# Patient Record
Sex: MALE | Race: ASIAN | NOT HISPANIC OR LATINO | URBAN - METROPOLITAN AREA
[De-identification: names, ages, dates, MRNs, and addresses within clinical notes are randomized per-mention and may not be internally consistent; named-entity substitution may affect disease eponyms.]

---

## 2019-07-23 ENCOUNTER — EMERGENCY (EMERGENCY)
Facility: HOSPITAL | Age: 64
LOS: 1 days | Discharge: HOME | End: 2019-07-23
Attending: EMERGENCY MEDICINE | Admitting: EMERGENCY MEDICINE
Payer: COMMERCIAL

## 2019-07-23 VITALS
TEMPERATURE: 97 F | SYSTOLIC BLOOD PRESSURE: 156 MMHG | OXYGEN SATURATION: 97 % | HEART RATE: 77 BPM | DIASTOLIC BLOOD PRESSURE: 94 MMHG | RESPIRATION RATE: 17 BRPM

## 2019-07-23 LAB
ALBUMIN SERPL ELPH-MCNC: 4.1 G/DL — SIGNIFICANT CHANGE UP (ref 3.3–5)
ALP SERPL-CCNC: 67 U/L — SIGNIFICANT CHANGE UP (ref 40–120)
ALT FLD-CCNC: 18 U/L — SIGNIFICANT CHANGE UP (ref 4–41)
ANION GAP SERPL CALC-SCNC: 10 MMO/L — SIGNIFICANT CHANGE UP (ref 7–14)
AST SERPL-CCNC: 15 U/L — SIGNIFICANT CHANGE UP (ref 4–40)
BASOPHILS # BLD AUTO: 0.05 K/UL — SIGNIFICANT CHANGE UP (ref 0–0.2)
BASOPHILS NFR BLD AUTO: 0.8 % — SIGNIFICANT CHANGE UP (ref 0–2)
BILIRUB SERPL-MCNC: 0.5 MG/DL — SIGNIFICANT CHANGE UP (ref 0.2–1.2)
BUN SERPL-MCNC: 8 MG/DL — SIGNIFICANT CHANGE UP (ref 7–23)
CALCIUM SERPL-MCNC: 9.5 MG/DL — SIGNIFICANT CHANGE UP (ref 8.4–10.5)
CHLORIDE SERPL-SCNC: 103 MMOL/L — SIGNIFICANT CHANGE UP (ref 98–107)
CO2 SERPL-SCNC: 27 MMOL/L — SIGNIFICANT CHANGE UP (ref 22–31)
CREAT SERPL-MCNC: 0.71 MG/DL — SIGNIFICANT CHANGE UP (ref 0.5–1.3)
EOSINOPHIL # BLD AUTO: 0.15 K/UL — SIGNIFICANT CHANGE UP (ref 0–0.5)
EOSINOPHIL NFR BLD AUTO: 2.5 % — SIGNIFICANT CHANGE UP (ref 0–6)
GLUCOSE SERPL-MCNC: 106 MG/DL — HIGH (ref 70–99)
HCT VFR BLD CALC: 42.6 % — SIGNIFICANT CHANGE UP (ref 39–50)
HGB BLD-MCNC: 14.5 G/DL — SIGNIFICANT CHANGE UP (ref 13–17)
IMM GRANULOCYTES NFR BLD AUTO: 0.5 % — SIGNIFICANT CHANGE UP (ref 0–1.5)
LIDOCAIN IGE QN: 36.7 U/L — SIGNIFICANT CHANGE UP (ref 7–60)
LIDOCAIN IGE QN: 37.3 U/L — SIGNIFICANT CHANGE UP (ref 7–60)
LYMPHOCYTES # BLD AUTO: 1.92 K/UL — SIGNIFICANT CHANGE UP (ref 1–3.3)
LYMPHOCYTES # BLD AUTO: 31.5 % — SIGNIFICANT CHANGE UP (ref 13–44)
MCHC RBC-ENTMCNC: 31 PG — SIGNIFICANT CHANGE UP (ref 27–34)
MCHC RBC-ENTMCNC: 34 % — SIGNIFICANT CHANGE UP (ref 32–36)
MCV RBC AUTO: 91 FL — SIGNIFICANT CHANGE UP (ref 80–100)
MONOCYTES # BLD AUTO: 0.51 K/UL — SIGNIFICANT CHANGE UP (ref 0–0.9)
MONOCYTES NFR BLD AUTO: 8.4 % — SIGNIFICANT CHANGE UP (ref 2–14)
NEUTROPHILS # BLD AUTO: 3.43 K/UL — SIGNIFICANT CHANGE UP (ref 1.8–7.4)
NEUTROPHILS NFR BLD AUTO: 56.3 % — SIGNIFICANT CHANGE UP (ref 43–77)
NRBC # FLD: 0 K/UL — SIGNIFICANT CHANGE UP (ref 0–0)
PLATELET # BLD AUTO: 241 K/UL — SIGNIFICANT CHANGE UP (ref 150–400)
PMV BLD: 9.6 FL — SIGNIFICANT CHANGE UP (ref 7–13)
POTASSIUM SERPL-MCNC: 4.2 MMOL/L — SIGNIFICANT CHANGE UP (ref 3.5–5.3)
POTASSIUM SERPL-SCNC: 4.2 MMOL/L — SIGNIFICANT CHANGE UP (ref 3.5–5.3)
PROT SERPL-MCNC: 7.2 G/DL — SIGNIFICANT CHANGE UP (ref 6–8.3)
RBC # BLD: 4.68 M/UL — SIGNIFICANT CHANGE UP (ref 4.2–5.8)
RBC # FLD: 11.8 % — SIGNIFICANT CHANGE UP (ref 10.3–14.5)
SODIUM SERPL-SCNC: 140 MMOL/L — SIGNIFICANT CHANGE UP (ref 135–145)
TROPONIN T, HIGH SENSITIVITY: 13 NG/L — SIGNIFICANT CHANGE UP (ref ?–14)
TROPONIN T, HIGH SENSITIVITY: 14 NG/L — SIGNIFICANT CHANGE UP (ref ?–14)
WBC # BLD: 6.09 K/UL — SIGNIFICANT CHANGE UP (ref 3.8–10.5)
WBC # FLD AUTO: 6.09 K/UL — SIGNIFICANT CHANGE UP (ref 3.8–10.5)

## 2019-07-23 PROCEDURE — 71045 X-RAY EXAM CHEST 1 VIEW: CPT | Mod: 26

## 2019-07-23 PROCEDURE — 99218: CPT

## 2019-07-23 RX ORDER — CARVEDILOL PHOSPHATE 80 MG/1
12.5 CAPSULE, EXTENDED RELEASE ORAL EVERY 12 HOURS
Refills: 0 | Status: DISCONTINUED | OUTPATIENT
Start: 2019-07-23 | End: 2019-07-27

## 2019-07-23 RX ORDER — METFORMIN HYDROCHLORIDE 850 MG/1
1000 TABLET ORAL
Refills: 0 | Status: DISCONTINUED | OUTPATIENT
Start: 2019-07-23 | End: 2019-07-27

## 2019-07-23 RX ORDER — LISINOPRIL 2.5 MG/1
20 TABLET ORAL DAILY
Refills: 0 | Status: DISCONTINUED | OUTPATIENT
Start: 2019-07-23 | End: 2019-07-27

## 2019-07-23 RX ORDER — ATORVASTATIN CALCIUM 80 MG/1
20 TABLET, FILM COATED ORAL AT BEDTIME
Refills: 0 | Status: DISCONTINUED | OUTPATIENT
Start: 2019-07-23 | End: 2019-07-27

## 2019-07-23 RX ADMIN — CARVEDILOL PHOSPHATE 12.5 MILLIGRAM(S): 80 CAPSULE, EXTENDED RELEASE ORAL at 22:09

## 2019-07-23 RX ADMIN — ATORVASTATIN CALCIUM 20 MILLIGRAM(S): 80 TABLET, FILM COATED ORAL at 22:09

## 2019-07-23 NOTE — ED PROVIDER NOTE - PMH
Diabetes Mellitus Type II (ICD9 250.00)    HTN (Hypertension) (ICD9 401.9)    NIDDM in Obese (ICD9 250.00)

## 2019-07-23 NOTE — ED PROVIDER NOTE - ATTENDING CONTRIBUTION TO CARE
64 year old with exterional cp. concern for acs vs pna vs ptx vs msk. will check labs trop ekg, cxr. if all wnl will send to cdu for stress to risk stratify

## 2019-07-23 NOTE — ED ADULT NURSE NOTE - NSIMPLEMENTINTERV_GEN_ALL_ED
Implemented All Universal Safety Interventions:  [place] to call system. Call bell, personal items and telephone within reach. Instruct patient to call for assistance. Room bathroom lighting operational. Non-slip footwear when patient is off stretcher. Physically safe environment: no spills, clutter or unnecessary equipment. Stretcher in lowest position, wheels locked, appropriate side rails in place.

## 2019-07-23 NOTE — ED PROVIDER NOTE - CLINICAL SUMMARY MEDICAL DECISION MAKING FREE TEXT BOX
64 y.o male pmhx of CAD with stent, HTN, HLD, DM coming in with nonexertional, nonpleuritic, L sided chest pain for the last 2 weeks. Will check basics, troponin, lipase, xray chest, EKG, admit/obs for stress.

## 2019-07-23 NOTE — ED PROVIDER NOTE - PROGRESS NOTE DETAILS
CXR wnl, trop 13. Will place in CDU for stress/echo, further observation and care  Braden Pro MD, PGY3 Emergency Medicine

## 2019-07-23 NOTE — ED ADULT NURSE NOTE - OBJECTIVE STATEMENT
Receive pt. in room 21 alert and oriented x 4 with complaints of left side chest pain. Pt. have a history of Cardiac stents, DM and stated " my chest have been hurting on and off for 2 weeks and it is not going away". Placed on cardiac monitor, labs sent will continue to monitor.

## 2019-07-23 NOTE — ED PROVIDER NOTE - OBJECTIVE STATEMENT
64 y.o male pmhx of CAD with stent, HTN, HLD, DM coming in with nonexertional, nonpleuritic, L sided chest pain for the last 2 weeks. States pain improves when he pushes on the area. Reports feeling weak as well and diaphoretic when he walk, no associated SOB. Denies fevers, chills, sob, cough, n/v/d, abdominal pain, numbness, tingling ,weakness. States he saw his cardiologist 1 month ago Dr. Jefferson and things were normal ( unsure of what tests performed). No recent travel, no LE swelling.

## 2019-07-23 NOTE — ED CDU PROVIDER INITIAL DAY NOTE - OBJECTIVE STATEMENT
64 y.o male pmhx of CAD with stent- 2014 in [facility], HTN, HLD, DM coming in with nonexertional, nonpleuritic, L sided chest pain for the last 2 weeks. States pain improves when he pushes on the area. Reports feeling weak as well and diaphoretic when he walk, no associated SOB. Denies fevers, chills, sob, cough, n/v/d, abdominal pain, numbness, tingling ,weakness. States he saw his cardiologist 1 month ago Dr. Jefferson and things were normal ( unsure of what tests performed). No recent travel, no LE swelling. in ER trop # 1 was 13, ekg no stemi or other acute findings, placed in cdu for tele, stress, echo, reasses

## 2019-07-24 VITALS
SYSTOLIC BLOOD PRESSURE: 155 MMHG | HEART RATE: 72 BPM | TEMPERATURE: 97 F | RESPIRATION RATE: 18 BRPM | DIASTOLIC BLOOD PRESSURE: 87 MMHG | OXYGEN SATURATION: 97 %

## 2019-07-24 PROCEDURE — 93016 CV STRESS TEST SUPVJ ONLY: CPT | Mod: GC

## 2019-07-24 PROCEDURE — 93018 CV STRESS TEST I&R ONLY: CPT | Mod: GC

## 2019-07-24 PROCEDURE — 78452 HT MUSCLE IMAGE SPECT MULT: CPT | Mod: 26

## 2019-07-24 PROCEDURE — 93306 TTE W/DOPPLER COMPLETE: CPT | Mod: 26

## 2019-07-24 PROCEDURE — 99217: CPT

## 2019-07-24 RX ADMIN — Medication 10 MILLIGRAM(S): at 09:05

## 2019-07-24 RX ADMIN — METFORMIN HYDROCHLORIDE 1000 MILLIGRAM(S): 850 TABLET ORAL at 09:05

## 2019-07-24 RX ADMIN — CARVEDILOL PHOSPHATE 12.5 MILLIGRAM(S): 80 CAPSULE, EXTENDED RELEASE ORAL at 10:43

## 2019-07-24 RX ADMIN — LISINOPRIL 20 MILLIGRAM(S): 2.5 TABLET ORAL at 06:28

## 2019-07-24 NOTE — ED CDU PROVIDER SUBSEQUENT DAY NOTE - PROGRESS NOTE DETAILS
Received signout from [facility] pending stress and echo this morning. Spoke with pts cardiologist Dr.Arun Jefferson ([phone], answering service [phone]) he is not in the office today so he is unable to confirm testing that pt reports was done 2 months ago. States if stress test is negative pt can follow up in his office Spoke with cardiology fellow in regards to stress test findings of hypokinesis and EF 44%, she spoke with  and he reported that the stress test results to not require further testing, within normal. Abnormal results were due to an adjustment of the pictures. Discussed with pt, he will follow up with his cardiologist . Pt is requesting to leave the CDU prior to having direct conversation with  myself. Spoke with , stress has no evidence of ischemia. EF on echo is more accurate than on stress, normal EF on echo.

## 2019-07-24 NOTE — ED CDU PROVIDER SUBSEQUENT DAY NOTE - ATTENDING CONTRIBUTION TO CARE
agree with PA note  "64 y.o male pmhx of CAD with stent- 2014 in [facility], HTN, HLD, DM coming in with nonexertional, nonpleuritic, L sided chest pain for the last 2 weeks. States pain improves when he pushes on the area. Reports feeling weak as well and diaphoretic when he walk, no associated SOB."  This morning pt has no complaints.  On my hx taking states pain arises mostly after eating.  States follows with Dr. Jefferson at [facility] and states gets yearly stress/echo and had it recently and was negative.    PE: well appearing; VSS: CTAB/L; s1 s2 no m/r/g abd soft/NT/ND ext: no edema     Imp: EKG LBBB with lateral ischemia; trop negative;  attempted to reach pt cardiologist regarding dispo/follow up

## 2019-07-24 NOTE — ED CDU PROVIDER SUBSEQUENT DAY NOTE - HISTORY
64 y.o male pmhx of CAD with stent- 2014 in [facility], HTN, HLD, DM coming in with nonexertional, nonpleuritic, L sided chest pain for the last 2 weeks. States pain improves when he pushes on the area. Reports feeling weak as well and diaphoretic when he walk, no associated SOB. Denies fevers, chills, sob, cough, n/v/d, abdominal pain, numbness, tingling ,weakness. States he saw his cardiologist 1 month ago Dr. Jefferson and things were normal ( unsure of what tests performed). No recent travel, no LE swelling. in ER trop # 1 was 13, ekg no stemi or other acute findings, placed in cdu for tele, stress, echo, reasses

## 2019-07-24 NOTE — ED CDU PROVIDER SUBSEQUENT DAY NOTE - MEDICAL DECISION MAKING DETAILS
pt resting ciomfortably in bed at this time, denies any chest pain, sob, diaphoresis, on tele, awaiting stress, echo, will  reasses

## 2019-07-24 NOTE — ED CDU PROVIDER DISPOSITION NOTE - CLINICAL COURSE
"64 y.o male pmhx of CAD with stent- 2014 in [facility], HTN, HLD, DM coming in with nonexertional, nonpleuritic, L sided chest pain for the last 2 weeks. States pain improves when he pushes on the area. Reports feeling weak as well and diaphoretic when he walk, no associated SOB."  This morning pt has no complaints.  On my hx taking states pain arises mostly after eating.  States follows with Dr. Jefferson at [facility] and states gets yearly stress/echo and had it recently and was negative.    EKG LBBB with lateral ischemic changes  trops negative  stress test showed area of hypokinesis; cardiology called and state within normal limits; pt has cardiologist he will follow up with this week.  Stable for d/c

## 2019-07-24 NOTE — ED CDU PROVIDER DISPOSITION NOTE - NSFOLLOWUPINSTRUCTIONS_ED_ALL_ED_FT
Follow up with your primary care doctor and cardiologist within 1 week   Show copies of your reports given to you.   Take all of your other medications as previously prescribed.   Worsening or continued chest pain, shortness of breath, weakness,  or any other concerns return to ED.

## 2022-02-03 PROBLEM — Z00.00 ENCOUNTER FOR PREVENTIVE HEALTH EXAMINATION: Status: ACTIVE | Noted: 2022-02-03

## 2022-02-07 ENCOUNTER — NON-APPOINTMENT (OUTPATIENT)
Age: 67
End: 2022-02-07

## 2022-02-07 ENCOUNTER — APPOINTMENT (OUTPATIENT)
Dept: ORTHOPEDIC SURGERY | Facility: CLINIC | Age: 67
End: 2022-02-07
Payer: MEDICARE

## 2022-02-07 PROCEDURE — 99203 OFFICE O/P NEW LOW 30 MIN: CPT

## 2022-02-07 NOTE — ADDENDUM
[FreeTextEntry1] : I, Judith De wrote this note acting as a scribe for Dr. Jay Edwards on Feb 07, 2022.

## 2022-02-07 NOTE — HISTORY OF PRESENT ILLNESS
[FreeTextEntry1] : Pt is a 65 y/o male c/o bilateral hand pain intermittently for 1 year.  He states that it has gotten significantly worse in the past 6 months.  He is unable to completely flex the fingers.  He has pain at the PIP's.  The fingers swell.  His son states that he had lab work which he reports was all normal.  The hands are tender to touch.  He cannot open bottles or  anything without pain.  He states about 10 years ago he saw a rheumatologist who did not find anything abnormal.\par \par He has an A1c of 7.9. He takes Metformin and Jardienz.

## 2022-02-07 NOTE — END OF VISIT
[FreeTextEntry3] : All medical record entries made by the Scribe were at my,  Dr. Jay Edwards MD., direction and personally dictated by me on 02/07/2022. I have personally reviewed the chart and agree that the record accurately reflects my personal performance of the history, physical exam, assessment and plan.

## 2022-02-07 NOTE — DISCUSSION/SUMMARY
[FreeTextEntry1] : The underlying pathophysiology was reviewed with the patient. XR films were reviewed with the patient. Discussed at length the nature of the patient’s condition. \par \par At this time, I am referring him to a rheumatologist for further evaluation and blood work. \par He was advised on use of topical modalities as well as oral antiinflammatories.\par He was instructed to soak the hand in warm water and Epsom salts for relief.\par I would not advise use of oral steroids or a cortisone injection given the side effects of elevating his blood sugar in the short term. \par Finally, I did instruct him on lifestyle changes such walking or use of a stationary bike as this may help in controlling his Diabetes.\par Deferred an RX for hand therapy.\par \par All questions answered, understanding verbalized. Patient in agreement with plan of care. Follow up in 6 weeks, if needed.

## 2022-02-07 NOTE — PHYSICAL EXAM
[de-identified] : Patient is WDWN, alert, and in no acute distress. Breathing is unlabored. He is grossly oriented to person, place, and time.\par \par He is accompanied by his son today.\par \par Right Hand:\par Normal sensation intact, provocative signs for carpal tunnel are negative.\par Notable swelling present to the PIP joints of the index, middle and ring fingers\par LImitations of motion about the PIP joints of the index, middle and ring fingers with limitations of motion at the DIP joints\par Full wrist motion into flexion and extension present. \par MIld tenderness to the A1 pulleys of the index, middle and ring fingers without triggering\par \par Left Hand:\par Normal sensation intact, provocative signs for carpal tunnel are negative.\par Notable swelling present to the PIP joints of the index, middle and ring fingers\par LImitations of motion about the PIP joints of the index, middle and ring fingers with limitations of motion to the DIP joint\par Full wrist motion into flexion and extension present.  [de-identified] : IMAGING OF THE RIGHT AND LEFT WRISTS/HANDS WERE REVIEWED FROM [facility] ON 10/9/21\par IMPRESSION:\par 3 views of the right hand --> Previous fracture, albeit healed noted to the distal aspect of the proximal phalanx. Arthritis present to the PIP joints of the digits\par 3 views of the left hand -->

## 2022-02-08 ENCOUNTER — RESULT REVIEW (OUTPATIENT)
Age: 67
End: 2022-02-08

## 2022-02-08 ENCOUNTER — OUTPATIENT (OUTPATIENT)
Dept: OUTPATIENT SERVICES | Facility: HOSPITAL | Age: 67
LOS: 1 days | End: 2022-02-08

## 2022-02-08 ENCOUNTER — APPOINTMENT (OUTPATIENT)
Dept: RHEUMATOLOGY | Facility: CLINIC | Age: 67
End: 2022-02-08
Payer: MEDICARE

## 2022-02-08 ENCOUNTER — APPOINTMENT (OUTPATIENT)
Dept: RADIOLOGY | Facility: CLINIC | Age: 67
End: 2022-02-08
Payer: MEDICARE

## 2022-02-08 ENCOUNTER — NON-APPOINTMENT (OUTPATIENT)
Age: 67
End: 2022-02-08

## 2022-02-08 VITALS
DIASTOLIC BLOOD PRESSURE: 71 MMHG | HEIGHT: 67 IN | BODY MASS INDEX: 32.33 KG/M2 | SYSTOLIC BLOOD PRESSURE: 119 MMHG | TEMPERATURE: 98.9 F | OXYGEN SATURATION: 97 % | HEART RATE: 71 BPM | WEIGHT: 206 LBS

## 2022-02-08 DIAGNOSIS — Z78.9 OTHER SPECIFIED HEALTH STATUS: ICD-10-CM

## 2022-02-08 PROCEDURE — 73130 X-RAY EXAM OF HAND: CPT | Mod: 26,LT,RT

## 2022-02-08 PROCEDURE — 99203 OFFICE O/P NEW LOW 30 MIN: CPT | Mod: 25

## 2022-02-09 PROBLEM — Z78.9 NON-SMOKER: Status: ACTIVE | Noted: 2022-02-08

## 2022-02-09 LAB
ALBUMIN SERPL ELPH-MCNC: 4.4 G/DL
ALP BLD-CCNC: 109 U/L
ALT SERPL-CCNC: 17 U/L
ANION GAP SERPL CALC-SCNC: 14 MMOL/L
AST SERPL-CCNC: 15 U/L
BASOPHILS # BLD AUTO: 0.06 K/UL
BASOPHILS NFR BLD AUTO: 0.8 %
BILIRUB SERPL-MCNC: 0.3 MG/DL
BUN SERPL-MCNC: 15 MG/DL
CALCIUM SERPL-MCNC: 10.1 MG/DL
CCP AB SER IA-ACNC: <8 UNITS
CHLORIDE SERPL-SCNC: 98 MMOL/L
CO2 SERPL-SCNC: 23 MMOL/L
CREAT SERPL-MCNC: 0.84 MG/DL
CRP SERPL-MCNC: 13 MG/L
EOSINOPHIL # BLD AUTO: 0.16 K/UL
EOSINOPHIL NFR BLD AUTO: 2.1 %
ERYTHROCYTE [SEDIMENTATION RATE] IN BLOOD BY WESTERGREN METHOD: 56 MM/HR
GLUCOSE SERPL-MCNC: 278 MG/DL
HCT VFR BLD CALC: 45.1 %
HGB BLD-MCNC: 14.5 G/DL
IMM GRANULOCYTES NFR BLD AUTO: 0.1 %
LYMPHOCYTES # BLD AUTO: 2 K/UL
LYMPHOCYTES NFR BLD AUTO: 25.9 %
MAN DIFF?: NORMAL
MCHC RBC-ENTMCNC: 30 PG
MCHC RBC-ENTMCNC: 32.2 GM/DL
MCV RBC AUTO: 93.2 FL
MONOCYTES # BLD AUTO: 0.47 K/UL
MONOCYTES NFR BLD AUTO: 6.1 %
NEUTROPHILS # BLD AUTO: 5.02 K/UL
NEUTROPHILS NFR BLD AUTO: 65 %
PLATELET # BLD AUTO: 309 K/UL
POTASSIUM SERPL-SCNC: 4.4 MMOL/L
PROT SERPL-MCNC: 7.5 G/DL
RBC # BLD: 4.84 M/UL
RBC # FLD: 12.1 %
RF+CCP IGG SER-IMP: NEGATIVE
SODIUM SERPL-SCNC: 135 MMOL/L
WBC # FLD AUTO: 7.72 K/UL

## 2022-02-09 NOTE — DATA REVIEWED
[FreeTextEntry1] : 	\par EXAM:  X-RAY LEFT HAND AND FINGERS 3 VIEWS\par \par HISTORY:  Hand pain.\par \par TECHNIQUE:  Radiographs left hand, PA, lateral, and oblique views.\par \par COMPARISON:  Radiographs of the contralateral hand of the same date.\par \par IMPRESSION:\par Bone mineral density appears normal.\par There is no visible acute or healing fracture. There is no post traumatic deformity.  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 There are no visible erosions.\par Articular alignment is normal.\par There are no appreciable lytic or blastic lesions.\par There is mild soft tissue swelling dorsal to the metacarpal heads.\par There is mild soft tissue swelling about the index finger PIP joint.\par \par Thank you for the opportunity to participate in the care of this patient.  \par  \par Jose Juan Arboleda MD  - Electronically Signed: 10- 2:50 PM \par Physician to Physician Direct Line is: [phone]\par \par \par EXAM:  X-RAY RIGHT HAND AND FINGERS 3 VIEWS\par \par HISTORY:  Hand pain.\par \par TECHNIQUE:  Radiographs right hand, PA, lateral, and oblique views.\par \par COMPARISON:  Radiographs of the contralateral hand of the same date.\par \par IMPRESSION:\par Bone mineral density appears normal.\par There is no visible acute or healing fracture. There is a 1 cm exostosis of the radial aspect of the fifth proximal phalangeal head neck junction, compatible with a posttraumatic exostosis versus an osteochondroma.\par There is moderate osseous irregularity of the ulnar styloid, suggesting old trauma.\par There is moderate to severe basilar and mild to moderate first MTP and first interphalangeal joint osteoarthritis. There is a 5 mm chronic ossicle radial to the third finger proximal phalangeal head, compatible with old trauma. There are no visible erosions.\par Articular alignment is normal.\par There are no appreciable lytic or blastic lesions.\par There is mild soft tissue swelling about the index finger PIP joint.\par \par \par Thank you for the opportunity to participate in the care of this patient.

## 2022-02-09 NOTE — REVIEW OF SYSTEMS
[Arthralgias] : arthralgias [Joint Pain] : joint pain [Joint Swelling] : joint swelling [Joint Stiffness] : joint stiffness [Negative] : Heme/Lymph [FreeTextEntry9] : hands

## 2022-02-09 NOTE — PHYSICAL EXAM
[General Appearance - Alert] : alert [General Appearance - In No Acute Distress] : in no acute distress [General Appearance - Well-Appearing] : healthy appearing [Sclera] : the sclera and conjunctiva were normal [Respiration, Rhythm And Depth] : normal respiratory rhythm and effort [Exaggerated Use Of Accessory Muscles For Inspiration] : no accessory muscle use [Abnormal Walk] : normal gait [] : no rash [FreeTextEntry1] : No psoriasis

## 2022-02-09 NOTE — HISTORY OF PRESENT ILLNESS
[FreeTextEntry1] : 66 year old man referred for rheumatology evaluation\par Patient with bilateral hand pain\par \par had a flare of pain in the hands in October 2021\par labs at that time with elevated ESR, uric acid 4.8\par  \par Similar episode previously occurred about 10 years ago in the fingers and wrists\par Was seen by rheumatologist in 2012, treated with injections for the hands with improvement, does not recall medications prescribed at that time\par today feels swelling in the hands without pain\par No other joints involved\par Reviewed previous xrays with patient and son as well\par \par Drives a taxi, 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

## 2022-02-09 NOTE — ASSESSMENT
[FreeTextEntry1] : 66 year old man with bilateral hand pain, swelling and stiffness, with worsening pain over the past six months, referred for rheumatology evaluation.  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  May also represent crystal induced arthropathy, although xray without chondrocalcinosis ad uric acid 4.8.  RF negative, will check anti CCP antibody. Will repeat xray today, will obtain further blood tests today including CBC, CMP, ESR, CRP, anti CCP and HLA B27 as well. further management pending results, follow up in two weeks.

## 2022-02-18 LAB — HLA-B27 RELATED AG QL: NEGATIVE

## 2022-03-08 ENCOUNTER — APPOINTMENT (OUTPATIENT)
Dept: RHEUMATOLOGY | Facility: CLINIC | Age: 67
End: 2022-03-08
Payer: MEDICARE

## 2022-03-08 VITALS
SYSTOLIC BLOOD PRESSURE: 138 MMHG | BODY MASS INDEX: 32.02 KG/M2 | HEIGHT: 67 IN | WEIGHT: 204 LBS | HEART RATE: 66 BPM | TEMPERATURE: 98 F | OXYGEN SATURATION: 97 % | DIASTOLIC BLOOD PRESSURE: 68 MMHG

## 2022-03-08 DIAGNOSIS — M79.643 PAIN IN UNSPECIFIED HAND: ICD-10-CM

## 2022-03-08 DIAGNOSIS — M19.049 PRIMARY OSTEOARTHRITIS, UNSPECIFIED HAND: ICD-10-CM

## 2022-03-08 PROCEDURE — 99213 OFFICE O/P EST LOW 20 MIN: CPT

## 2022-03-08 RX ORDER — PREDNISONE 20 MG/1
20 TABLET ORAL DAILY
Qty: 5 | Refills: 0 | Status: ACTIVE | COMMUNITY
Start: 2022-03-08 | End: 1900-01-01

## 2022-03-08 NOTE — PHYSICAL EXAM
[General Appearance - Alert] : alert [General Appearance - In No Acute Distress] : in no acute distress [General Appearance - Well-Appearing] : healthy appearing [Sclera] : the sclera and conjunctiva were normal [] : no respiratory distress [Respiration, Rhythm And Depth] : normal respiratory rhythm and effort [Exaggerated Use Of Accessory Muscles For Inspiration] : no accessory muscle use [Abnormal Walk] : normal gait [FreeTextEntry1] : edema of the bilateral PIP, edema of the bilateral 2nd-4th MCP, left wrist with positive finkelsteins

## 2022-03-08 NOTE — ASSESSMENT
[FreeTextEntry1] : 66 year old man with bilateral hand pain, swelling and stiffness, with worsening pain over the past six months, referred for rheumatology evaluation.  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  May also represent crystal induced arthropathy, although xray without chondrocalcinosis ad uric acid 4.8.  RF negative, will check anti CCP antibody.  discussed trial of prednisone 20 mg qday but concerned about hyperglycemia given diabetes.  Son will reach out to PMD regarding medications for diabetes.  Discussed possible addition fo methotrexate if response to prednisone.  Discussed with son and patient at length. Son will call office after complete course to discuss response and possible initiation of methotrexate at that time.

## 2022-03-08 NOTE — HISTORY OF PRESENT ILLNESS
[FreeTextEntry1] : 66 year old man referred for rheumatology evaluation\par Patient with bilateral hand pain\par \par had a flare of pain in the hands in October 2021\par labs at that time with elevated ESR, uric acid 4.8\par  \par Similar episode previously occurred about 10 years ago in the fingers and wrists\par Was seen by rheumatologist in 2012, treated with injections for the hands with improvement, does not recall medications prescribed at that time\par today feels swelling in the hands without pain\par No other joints involved\par Reviewed previous xrays with patient and son as well\par \par Drives a taxi, 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 \par March 8, 2022\par Patient returns for follow up\par Feeling pain and stiffness in the fingers, feels some benefit from ibuprofen but pain recurs\par No other joints involved other than hands and wrists.\par Reviewed xrays with patient and son\par Reviewed lab results with patient and son\par Reviewed treatment options with patient\par Patient with elevated sugars

## 2022-03-08 NOTE — DATA REVIEWED
[FreeTextEntry1] : 	\par EXAM:  X-RAY LEFT HAND AND FINGERS 3 VIEWS\par \par HISTORY:  Hand pain.\par \par TECHNIQUE:  Radiographs left hand, PA, lateral, and oblique views.\par \par COMPARISON:  Radiographs of the contralateral hand of the same date.\par \par IMPRESSION:\par Bone mineral density appears normal.\par There is no visible acute or healing fracture. There is no post traumatic deformity.  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 There are no visible erosions.\par Articular alignment is normal.\par There are no appreciable lytic or blastic lesions.\par There is mild soft tissue swelling dorsal to the metacarpal heads.\par There is mild soft tissue swelling about the index finger PIP joint.\par \par Thank you for the opportunity to participate in the care of this patient.  \par  \par Jose Juan Arboleda MD  - Electronically Signed: 10- 2:50 PM \par Physician to Physician Direct Line is: [phone]\par \par \par EXAM:  X-RAY RIGHT HAND AND FINGERS 3 VIEWS\par \par HISTORY:  Hand pain.\par \par TECHNIQUE:  Radiographs right hand, PA, lateral, and oblique views.\par \par COMPARISON:  Radiographs of the contralateral hand of the same date.\par \par IMPRESSION:\par Bone mineral density appears normal.\par There is no visible acute or healing fracture. There is a 1 cm exostosis of the radial aspect of the fifth proximal phalangeal head neck junction, compatible with a posttraumatic exostosis versus an osteochondroma.\par There is moderate osseous irregularity of the ulnar styloid, suggesting old trauma.\par There is moderate to severe basilar and mild to moderate first MTP and first interphalangeal joint osteoarthritis. There is a 5 mm chronic ossicle radial to the third finger proximal phalangeal head, compatible with old trauma. There are no visible erosions.\par Articular alignment is normal.\par There are no appreciable lytic or blastic lesions.\par There is mild soft tissue swelling about the index finger PIP joint.\par \par \par Thank you for the opportunity to participate in the care of this patient.

## 2023-07-18 ENCOUNTER — INPATIENT (INPATIENT)
Facility: HOSPITAL | Age: 68
LOS: 2 days | Discharge: HOME | End: 2023-07-21
Attending: INTERNAL MEDICINE | Admitting: INTERNAL MEDICINE
Payer: MEDICARE

## 2023-07-18 VITALS
DIASTOLIC BLOOD PRESSURE: 99 MMHG | TEMPERATURE: 98 F | HEART RATE: 67 BPM | SYSTOLIC BLOOD PRESSURE: 138 MMHG | RESPIRATION RATE: 16 BRPM | OXYGEN SATURATION: 100 %

## 2023-07-18 LAB
ALBUMIN SERPL ELPH-MCNC: 4.2 G/DL — SIGNIFICANT CHANGE UP (ref 3.3–5)
ALP SERPL-CCNC: 67 U/L — SIGNIFICANT CHANGE UP (ref 40–120)
ALT FLD-CCNC: 16 U/L — SIGNIFICANT CHANGE UP (ref 4–41)
ANION GAP SERPL CALC-SCNC: 15 MMOL/L — HIGH (ref 7–14)
AST SERPL-CCNC: 19 U/L — SIGNIFICANT CHANGE UP (ref 4–40)
BASOPHILS # BLD AUTO: 0.03 K/UL — SIGNIFICANT CHANGE UP (ref 0–0.2)
BASOPHILS NFR BLD AUTO: 0.5 % — SIGNIFICANT CHANGE UP (ref 0–2)
BILIRUB SERPL-MCNC: 0.2 MG/DL — SIGNIFICANT CHANGE UP (ref 0.2–1.2)
BUN SERPL-MCNC: 5 MG/DL — LOW (ref 7–23)
CALCIUM SERPL-MCNC: 9.2 MG/DL — SIGNIFICANT CHANGE UP (ref 8.4–10.5)
CHLORIDE SERPL-SCNC: 98 MMOL/L — SIGNIFICANT CHANGE UP (ref 98–107)
CO2 SERPL-SCNC: 23 MMOL/L — SIGNIFICANT CHANGE UP (ref 22–31)
CREAT SERPL-MCNC: 0.73 MG/DL — SIGNIFICANT CHANGE UP (ref 0.5–1.3)
EGFR: 100 ML/MIN/1.73M2 — SIGNIFICANT CHANGE UP
EOSINOPHIL # BLD AUTO: 0.08 K/UL — SIGNIFICANT CHANGE UP (ref 0–0.5)
EOSINOPHIL NFR BLD AUTO: 1.4 % — SIGNIFICANT CHANGE UP (ref 0–6)
GLUCOSE SERPL-MCNC: 242 MG/DL — HIGH (ref 70–99)
HCT VFR BLD CALC: 40.3 % — SIGNIFICANT CHANGE UP (ref 39–50)
HGB BLD-MCNC: 13.8 G/DL — SIGNIFICANT CHANGE UP (ref 13–17)
IANC: 3.74 K/UL — SIGNIFICANT CHANGE UP (ref 1.8–7.4)
IMM GRANULOCYTES NFR BLD AUTO: 0.4 % — SIGNIFICANT CHANGE UP (ref 0–0.9)
LYMPHOCYTES # BLD AUTO: 1.18 K/UL — SIGNIFICANT CHANGE UP (ref 1–3.3)
LYMPHOCYTES # BLD AUTO: 21.3 % — SIGNIFICANT CHANGE UP (ref 13–44)
MCHC RBC-ENTMCNC: 31.2 PG — SIGNIFICANT CHANGE UP (ref 27–34)
MCHC RBC-ENTMCNC: 34.2 GM/DL — SIGNIFICANT CHANGE UP (ref 32–36)
MCV RBC AUTO: 91.2 FL — SIGNIFICANT CHANGE UP (ref 80–100)
MONOCYTES # BLD AUTO: 0.48 K/UL — SIGNIFICANT CHANGE UP (ref 0–0.9)
MONOCYTES NFR BLD AUTO: 8.7 % — SIGNIFICANT CHANGE UP (ref 2–14)
NEUTROPHILS # BLD AUTO: 3.74 K/UL — SIGNIFICANT CHANGE UP (ref 1.8–7.4)
NEUTROPHILS NFR BLD AUTO: 67.7 % — SIGNIFICANT CHANGE UP (ref 43–77)
NRBC # BLD: 0 /100 WBCS — SIGNIFICANT CHANGE UP (ref 0–0)
NRBC # FLD: 0 K/UL — SIGNIFICANT CHANGE UP (ref 0–0)
PLATELET # BLD AUTO: 250 K/UL — SIGNIFICANT CHANGE UP (ref 150–400)
POTASSIUM SERPL-MCNC: 4 MMOL/L — SIGNIFICANT CHANGE UP (ref 3.5–5.3)
POTASSIUM SERPL-SCNC: 4 MMOL/L — SIGNIFICANT CHANGE UP (ref 3.5–5.3)
PROT SERPL-MCNC: 6.8 G/DL — SIGNIFICANT CHANGE UP (ref 6–8.3)
RBC # BLD: 4.42 M/UL — SIGNIFICANT CHANGE UP (ref 4.2–5.8)
RBC # FLD: 12.1 % — SIGNIFICANT CHANGE UP (ref 10.3–14.5)
SODIUM SERPL-SCNC: 136 MMOL/L — SIGNIFICANT CHANGE UP (ref 135–145)
TROPONIN T, HIGH SENSITIVITY RESULT: 13 NG/L — SIGNIFICANT CHANGE UP
TROPONIN T, HIGH SENSITIVITY RESULT: 14 NG/L — SIGNIFICANT CHANGE UP
WBC # BLD: 5.53 K/UL — SIGNIFICANT CHANGE UP (ref 3.8–10.5)
WBC # FLD AUTO: 5.53 K/UL — SIGNIFICANT CHANGE UP (ref 3.8–10.5)

## 2023-07-18 PROCEDURE — 99223 1ST HOSP IP/OBS HIGH 75: CPT

## 2023-07-18 PROCEDURE — 93306 TTE W/DOPPLER COMPLETE: CPT | Mod: 26

## 2023-07-18 RX ORDER — DEXTROSE 50 % IN WATER 50 %
15 SYRINGE (ML) INTRAVENOUS ONCE
Refills: 0 | Status: DISCONTINUED | OUTPATIENT
Start: 2023-07-18 | End: 2023-07-21

## 2023-07-18 RX ORDER — SODIUM CHLORIDE 9 MG/ML
1000 INJECTION, SOLUTION INTRAVENOUS
Refills: 0 | Status: DISCONTINUED | OUTPATIENT
Start: 2023-07-18 | End: 2023-07-21

## 2023-07-18 RX ORDER — INSULIN LISPRO 100/ML
VIAL (ML) SUBCUTANEOUS AT BEDTIME
Refills: 0 | Status: DISCONTINUED | OUTPATIENT
Start: 2023-07-18 | End: 2023-07-21

## 2023-07-18 RX ORDER — ATORVASTATIN CALCIUM 80 MG/1
20 TABLET, FILM COATED ORAL AT BEDTIME
Refills: 0 | Status: DISCONTINUED | OUTPATIENT
Start: 2023-07-18 | End: 2023-07-21

## 2023-07-18 RX ORDER — ASPIRIN/CALCIUM CARB/MAGNESIUM 324 MG
324 TABLET ORAL ONCE
Refills: 0 | Status: COMPLETED | OUTPATIENT
Start: 2023-07-18 | End: 2023-07-18

## 2023-07-18 RX ORDER — LISINOPRIL 2.5 MG/1
40 TABLET ORAL DAILY
Refills: 0 | Status: DISCONTINUED | OUTPATIENT
Start: 2023-07-18 | End: 2023-07-19

## 2023-07-18 RX ORDER — DEXTROSE 50 % IN WATER 50 %
25 SYRINGE (ML) INTRAVENOUS ONCE
Refills: 0 | Status: DISCONTINUED | OUTPATIENT
Start: 2023-07-18 | End: 2023-07-21

## 2023-07-18 RX ORDER — GLUCAGON INJECTION, SOLUTION 0.5 MG/.1ML
1 INJECTION, SOLUTION SUBCUTANEOUS ONCE
Refills: 0 | Status: DISCONTINUED | OUTPATIENT
Start: 2023-07-18 | End: 2023-07-21

## 2023-07-18 RX ORDER — ASPIRIN/CALCIUM CARB/MAGNESIUM 324 MG
81 TABLET ORAL DAILY
Refills: 0 | Status: DISCONTINUED | OUTPATIENT
Start: 2023-07-18 | End: 2023-07-21

## 2023-07-18 RX ORDER — INSULIN LISPRO 100/ML
VIAL (ML) SUBCUTANEOUS
Refills: 0 | Status: DISCONTINUED | OUTPATIENT
Start: 2023-07-18 | End: 2023-07-21

## 2023-07-18 RX ADMIN — ATORVASTATIN CALCIUM 20 MILLIGRAM(S): 80 TABLET, FILM COATED ORAL at 22:40

## 2023-07-18 RX ADMIN — Medication 324 MILLIGRAM(S): at 11:55

## 2023-07-18 NOTE — ED ADULT TRIAGE NOTE - CHIEF COMPLAINT QUOTE
Pt c/o intermittent L side chest pain, dizziness , pain to back of head ,  nervousness and"  lips shaking " x 1week.   Pt with hx of DM, stent x 1

## 2023-07-18 NOTE — ED ADULT NURSE REASSESSMENT NOTE - NS ED NURSE REASSESS COMMENT FT1
Break RN covering, pt in stretcher watching tv remains on tele. respirations even and unlabored. pending stress/echo. no new orders. will continue to monitor.

## 2023-07-18 NOTE — ED CDU PROVIDER INITIAL DAY NOTE - NS ED ATTENDING STATEMENT MOD
This was a shared visit with the MELLISA. I reviewed and verified the documentation and independently performed the documented:

## 2023-07-18 NOTE — ED CDU PROVIDER INITIAL DAY NOTE - CLINICAL SUMMARY MEDICAL DECISION MAKING FREE TEXT BOX
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  The patient pulled the car over had some water and juice fearing that it was a hypoglycemic episode and then decided not to drive for the rest of the day.  Since the chest pain persisted and he felt like he was having a heart attack he decided to come to the ED.  Denies fever, chills, shortness of breath, cough, nausea, vomiting, numbness, tingling, weakness, abdominal pain.  CDU PA: Reviewed above.  Patient with known CAD with episode of CP and dizziness while driving.  In ED, EKG LBBB unchanged from prior.  Received asa 162mg, now with improvement of symptoms.  Patient also notes over the past 10 days has been having increased SOB on exertion but is unsure if that was due it being hot outside.  Pt sent to CDU for r/o ACS, tele monitoring, echo and stress.  Patient has not been following up with cardiologist since stent placement.

## 2023-07-18 NOTE — ED CDU PROVIDER INITIAL DAY NOTE - OBJECTIVE STATEMENT
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  The patient pulled the car over had some water and juice fearing that it was a hypoglycemic episode and then decided not to drive for the rest of the day.  Since the chest pain persisted and he felt like he was having a heart attack he decided to come to the ED.  Denies fever, chills, shortness of breath, cough, nausea, vomiting, numbness, tingling, weakness, abdominal pain.  CDU PA: Reviewed above.  Patient with known CAD with episode of CP and dizziness while driving.  In ED, EKG LBBB unchanged from prior.  Received asa 162mg, now with improvement of symptoms.  Patient also notes over the past 10 days has been having increased SOB on exertion but is unsure if that was due it being hot outside.  Pt sent to CDU for r/o ACS, tele monitoring, echo and stress.  Patient has not been following up with cardiologist since stent placement.

## 2023-07-18 NOTE — ED PROVIDER NOTE - CLINICAL SUMMARY MEDICAL DECISION MAKING FREE TEXT BOX
Will perform EKG as well as chest x-ray and labs including troponin to rule out ACS but will likely keep the patient in the CDU due to his history and presentation for echo and stress test to be evaluated by cardiology.

## 2023-07-18 NOTE — ED CDU PROVIDER INITIAL DAY NOTE - NS ED ROS FT
ROS:  GENERAL: No fever, no chills  EYES: no change in vision  HEENT: no trouble swallowing, no trouble speaking  CARDIAC: +chest pain  PULMONARY: no cough, no shortness of breath  GI: no abdominal pain, no nausea, no vomiting, no diarrhea, no constipation  : No dysuria, no frequency, no change in appearance, or odor of urine  SKIN: no rashes  NEURO: no headache, no weakness  MSK: No joint pain

## 2023-07-18 NOTE — ED CDU PROVIDER INITIAL DAY NOTE - NSICDXPASTMEDICALHX_GEN_ALL_CORE_FT
PAST MEDICAL HISTORY:  Diabetes Mellitus Type II (ICD9 250.00)     HTN (Hypertension) (ICD9 401.9)     NIDDM in Obese (ICD9 250.00)

## 2023-07-18 NOTE — ED PROVIDER NOTE - OBJECTIVE STATEMENT
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  The patient pulled the car over had some water and juice fearing that it was a hypoglycemic episode and then decided not to drive for the rest of the day.  Since the chest pain persisted and he felt like he was having a heart attack he decided to come to the ED.  Denies fever, chills, shortness of breath, cough, nausea, vomiting, numbness, tingling, weakness, abdominal pain.

## 2023-07-19 DIAGNOSIS — R07.9 CHEST PAIN, UNSPECIFIED: ICD-10-CM

## 2023-07-19 LAB
A1C WITH ESTIMATED AVERAGE GLUCOSE RESULT: 6.9 % — HIGH (ref 4–5.6)
ESTIMATED AVERAGE GLUCOSE: 151 — SIGNIFICANT CHANGE UP
GLUCOSE BLDC GLUCOMTR-MCNC: 140 MG/DL — HIGH (ref 70–99)
GLUCOSE BLDC GLUCOMTR-MCNC: 154 MG/DL — HIGH (ref 70–99)
GLUCOSE BLDC GLUCOMTR-MCNC: 239 MG/DL — HIGH (ref 70–99)

## 2023-07-19 PROCEDURE — 99152 MOD SED SAME PHYS/QHP 5/>YRS: CPT

## 2023-07-19 PROCEDURE — 92928 PRQ TCAT PLMT NTRAC ST 1 LES: CPT | Mod: RC

## 2023-07-19 PROCEDURE — 93880 EXTRACRANIAL BILAT STUDY: CPT | Mod: 26

## 2023-07-19 PROCEDURE — 93458 L HRT ARTERY/VENTRICLE ANGIO: CPT | Mod: 26,59

## 2023-07-19 PROCEDURE — 93010 ELECTROCARDIOGRAM REPORT: CPT

## 2023-07-19 PROCEDURE — 99233 SBSQ HOSP IP/OBS HIGH 50: CPT

## 2023-07-19 RX ORDER — HEPARIN SODIUM 5000 [USP'U]/ML
5000 INJECTION INTRAVENOUS; SUBCUTANEOUS EVERY 8 HOURS
Refills: 0 | Status: DISCONTINUED | OUTPATIENT
Start: 2023-07-19 | End: 2023-07-21

## 2023-07-19 RX ORDER — SODIUM CHLORIDE 9 MG/ML
1000 INJECTION INTRAMUSCULAR; INTRAVENOUS; SUBCUTANEOUS
Refills: 0 | Status: DISCONTINUED | OUTPATIENT
Start: 2023-07-19 | End: 2023-07-21

## 2023-07-19 RX ORDER — PANTOPRAZOLE SODIUM 20 MG/1
40 TABLET, DELAYED RELEASE ORAL
Refills: 0 | Status: DISCONTINUED | OUTPATIENT
Start: 2023-07-19 | End: 2023-07-21

## 2023-07-19 RX ORDER — CARVEDILOL PHOSPHATE 80 MG/1
3.12 CAPSULE, EXTENDED RELEASE ORAL EVERY 12 HOURS
Refills: 0 | Status: DISCONTINUED | OUTPATIENT
Start: 2023-07-19 | End: 2023-07-21

## 2023-07-19 RX ORDER — SACUBITRIL AND VALSARTAN 24; 26 MG/1; MG/1
1 TABLET, FILM COATED ORAL
Refills: 0 | Status: DISCONTINUED | OUTPATIENT
Start: 2023-07-21 | End: 2023-07-21

## 2023-07-19 RX ORDER — CLOPIDOGREL BISULFATE 75 MG/1
75 TABLET, FILM COATED ORAL DAILY
Refills: 0 | Status: DISCONTINUED | OUTPATIENT
Start: 2023-07-20 | End: 2023-07-21

## 2023-07-19 RX ADMIN — HEPARIN SODIUM 5000 UNIT(S): 5000 INJECTION INTRAVENOUS; SUBCUTANEOUS at 22:31

## 2023-07-19 RX ADMIN — PANTOPRAZOLE SODIUM 40 MILLIGRAM(S): 20 TABLET, DELAYED RELEASE ORAL at 09:54

## 2023-07-19 RX ADMIN — HEPARIN SODIUM 5000 UNIT(S): 5000 INJECTION INTRAVENOUS; SUBCUTANEOUS at 13:34

## 2023-07-19 RX ADMIN — Medication 81 MILLIGRAM(S): at 12:10

## 2023-07-19 RX ADMIN — SODIUM CHLORIDE 75 MILLILITER(S): 9 INJECTION INTRAMUSCULAR; INTRAVENOUS; SUBCUTANEOUS at 19:46

## 2023-07-19 RX ADMIN — Medication 2: at 11:50

## 2023-07-19 RX ADMIN — LISINOPRIL 40 MILLIGRAM(S): 2.5 TABLET ORAL at 05:15

## 2023-07-19 RX ADMIN — ATORVASTATIN CALCIUM 20 MILLIGRAM(S): 80 TABLET, FILM COATED ORAL at 22:31

## 2023-07-19 NOTE — CONSULT NOTE ADULT - ASSESSMENT
Chest pain   ruled out for acute MI   has ischemic CM   asa  start BB  statin   Cath today     chronic systolic CHF  ICM   add coreg  dc lisinopril   entresto to start in 2 days    HTN  plan as above    Dm II  Monitor finger stick. Insulin coverage. Diabetic education and Diabetic diet. Consider nutrition consultation.    Dizziness  check carotid doppler  monitor on tele     Advanced care planning was discussed with patient and family.  Risks, benefits and alternatives of the cardiac treatments and medical therapy including procedures were discussed in detail and all questions were answered. Importance of compliance with medical therapy and lifestyle modification to improve cardiovascular health were addressed. Appropriate forms and patient educational materials were reviewed. 30 minutes face to face spent.

## 2023-07-19 NOTE — PATIENT PROFILE ADULT - FALL HARM RISK - HARM RISK INTERVENTIONS

## 2023-07-19 NOTE — ED ADULT NURSE REASSESSMENT NOTE - NS ED NURSE REASSESS COMMENT FT1
Pt alert and orientedx4, in no apparent distress. Pt denies chest pain, SOB, lightheadedness, pain. Pending cardiac studies, Call bell within reach, bed in lowest position, will continue to monitor.

## 2023-07-19 NOTE — ED CDU PROVIDER SUBSEQUENT DAY NOTE - CLINICAL SUMMARY MEDICAL DECISION MAKING FREE TEXT BOX
68-year-old male with past medical history of CAD with stent placed in 2014 in [facility], hypertension, hyperlipidemia, diabetes coming in with an episode of chest pain   In ED, EKG LBBB unchanged from prior.  Received asa 162mg, now with improvement of symptoms.  Patient also notes over the past 10 days has been having increased SOB on exertion but is unsure if that was due it being hot outside.  Pt sent to CDU for r/o ACS, tele monitoring, echo and stress.  Patient has not been following up with cardiologist since stent placement.  TTE shows Endocardial visualization enhanced with intravenous injection of echo contrast (Definity). Moderate segmental left ventricular systolic dysfunction with hypokinesis of the anterior and lateral nagel.   Pt seen by Dr. Nelson this morning, advised to admit to medicine for cardiac cath. Pt to be admitted to Dr. Hoang

## 2023-07-19 NOTE — H&P ADULT - HISTORY OF PRESENT ILLNESS
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  The patient pulled the car over had some water and juice fearing that it was a hypoglycemic episode and then decided not to drive for the rest of the day.  Since the chest pain persisted and he felt like he was having a heart attack he decided to come to the ED.  Denies fever, chills, shortness of breath, cough, nausea, vomiting, numbness, tingling, weakness, abdominal pain.

## 2023-07-19 NOTE — ED CDU PROVIDER SUBSEQUENT DAY NOTE - ATTENDING APP SHARED VISIT CONTRIBUTION OF CARE
I have evaluated the patient and agree with the documentation and assessment as made by the MELLISA. We have discussed plan of care and work up for the patient.   This was a shared visit with the MELLISA. I reviewed and verified the documentation and independently performed the documented:   History, Exam and Medical Decision Making.    68M, CAD, HTN, HLD, DM who presents with chest pain. Sent to CDU for cardiac risk stratification. TTE demonstration: moderate segmental left ventricular systolic dysfunction with hypokinesis of the anterior and lateral nagel. Evaluated by MD Nelson. To be admitted for stress.

## 2023-07-19 NOTE — ED CDU PROVIDER DISPOSITION NOTE - CLINICAL COURSE
68-year-old male with past medical history of CAD with stent placed in 2014 in [facility], hypertension, hyperlipidemia, diabetes coming in with an episode of chest pain   In ED, EKG LBBB unchanged from prior.  Received asa 162mg, now with improvement of symptoms.  Patient also notes over the past 10 days has been having increased SOB on exertion but is unsure if that was due it being hot outside.  Pt sent to CDU for r/o ACS, tele monitoring, echo and stress.  Patient has not been following up with cardiologist since stent placement.  TTE shows Endocardial visualization enhanced with intravenous injection of echo contrast (Definity). Moderate segmental left ventricular systolic dysfunction with hypokinesis of the anterior and lateral nagel.   Pt seen by Dr. Nelson this morning, advised to admit to medicine for cardiac cath. Pt to be admitted to Dr. Hoang

## 2023-07-19 NOTE — ED CDU PROVIDER DISPOSITION NOTE - NS ED ATTENDING STATEMENT MOD
This was a shared visit with the MELLISA. I reviewed and verified the documentation and independently performed the documented: Attending with

## 2023-07-19 NOTE — ED CDU PROVIDER DISPOSITION NOTE - ATTENDING CONTRIBUTION TO CARE
I have evaluated the patient and agree with the documentation and assessment as made by the MELLISA. We have discussed plan of care and work up for the patient.     68M, CAD, HTN, HLD, DM who presents with chest pain. Sent to CDU for cardiac risk stratification. TTE demonstration: moderate segmental left ventricular systolic dysfunction with hypokinesis of the anterior and lateral nagel. Evaluated by MD Nelson. To be admitted for stress.

## 2023-07-19 NOTE — H&P ADULT - NSHPPHYSICALEXAM_GEN_ALL_CORE
Vital Signs Last 24 Hrs  T(C): 36.6 (19 Jul 2023 05:10), Max: 37.1 (18 Jul 2023 13:15)  T(F): 97.9 (19 Jul 2023 05:10), Max: 98.8 (18 Jul 2023 13:15)  HR: 82 (19 Jul 2023 05:10) (67 - 88)  BP: 151/85 (19 Jul 2023 05:10) (133/83 - 179/94)  BP(mean): --  RR: 18 (19 Jul 2023 05:10) (15 - 20)  SpO2: 96% (19 Jul 2023 05:10) (96% - 100%)    Appearance: Normal	  HEENT:   Normal oral mucosa, PERRL, EOMI	  Lymphatic: No lymphadenopathy , no edema  Cardiovascular: Normal S1 S2, No JVD, No murmurs , Peripheral pulses palpable 2+ bilaterally  Respiratory: Lungs clear to auscultation, normal effort 	  Gastrointestinal:  Soft, Non-tender, + BS	  Skin: No rashes, No ecchymoses, No cyanosis, warm to touch  Musculoskeletal: Normal range of motion, normal strength  Psychiatry:  Mood & affect appropriate  Ext: No edema

## 2023-07-19 NOTE — CONSULT NOTE ADULT - SUBJECTIVE AND OBJECTIVE BOX
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 then felt like he is having a heart attach and chest discomfort came to ED for eval     PAST MEDICAL & SURGICAL HISTORY:  HTN (Hypertension) (ICD9 401.9)      Diabetes Mellitus Type II (ICD9 250.00)      NIDDM in Obese (ICD9 250.00)      Shoulder Fracture  Repaired              MEDICATIONS:  aspirin  chewable 81 milliGRAM(s) Oral daily  lisinopril 40 milliGRAM(s) Oral daily            atorvastatin 20 milliGRAM(s) Oral at bedtime  dextrose 50% Injectable 25 Gram(s) IV Push once  dextrose Oral Gel 15 Gram(s) Oral once PRN  glucagon  Injectable 1 milliGRAM(s) IntraMuscular once  insulin lispro (ADMELOG) corrective regimen sliding scale   SubCutaneous at bedtime  insulin lispro (ADMELOG) corrective regimen sliding scale   SubCutaneous three times a day before meals    dextrose 5%. 1000 milliLiter(s) IV Continuous <Continuous>      FAMILY HISTORY:  No pertinent family history in first degree relatives        Non-contributory    SOCIAL HISTORY:    No tobacco, drugs or etoh    Allergies    No Known Allergies    Intolerances    	    REVIEW OF SYSTEMS:  as above  The rest of the 14 points ROS reviewed and except above they are unremarkable.        PHYSICAL EXAM:  T(C): 36.8 (07-19-23 @ 01:40), Max: 37.1 (07-18-23 @ 13:15)  HR: 88 (07-19-23 @ 01:40) (67 - 88)  BP: 146/83 (07-19-23 @ 01:40) (133/83 - 179/94)  RR: 20 (07-19-23 @ 01:40) (15 - 20)  SpO2: 99% (07-19-23 @ 01:40) (96% - 100%)  Wt(kg): --  I&O's Summary      JVP: Normal  Neck: supple  Lung: clear   CV: S1 S2 , Murmur: pos jossy   Abd: soft  Ext: No edema  neuro: Awake / alert  Psych: flat affect  Skin: normal      LABS/DATA:    TELEMETRY: 	  sinus PVC  ECG:  	   	  CARDIAC MARKERS:    < from: TTE with Doppler (w/Cont) (07.18.23 @ 17:53) >  CONCLUSIONS:  1. Endocardial visualization enhanced with intravenous  injection of echo contrast (Definity). Moderate segmental  left ventricular systolic dysfunction with hypokinesis of  the anterior and lateral walls.  2. The right ventricle is not well visualized; grossly  normal right ventricular systolic function.  ------------------------------------------------------------------------  Confirmed on  7/18/2023 - 18:42:12 by Marlena Carter MD  ------------------------------------------------------------------------    < end of copied text >                      13 <<== 07-18-23 @ 12:33                14 <<== 07-18-23 @ 10:50                              13.8   5.53  )-----------( 250      ( 18 Jul 2023 10:50 )             40.3     07-18    136  |  98  |  5<L>  ----------------------------<  242<H>  4.0   |  23  |  0.73    Ca    9.2      18 Jul 2023 10:50    TPro  6.8  /  Alb  4.2  /  TBili  0.2  /  DBili  x   /  AST  19  /  ALT  16  /  AlkPhos  67  07-18    proBNP:   Lipid Profile:   HgA1c:   TSH:

## 2023-07-19 NOTE — H&P ADULT - ASSESSMENT
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  The patient pulled the car over had some water and juice fearing that it was a hypoglycemic episode and then decided not to drive for the rest of the day.  Since the chest pain persisted and he felt like he was having a heart attack he decided to come to the ED.  Denies fever, chills, shortness of breath, cough, nausea, vomiting, numbness, tingling, weakness, abdominal pain.    # Chest pain  Hx of CAD S/P PCI   R/O ACS  Serial CE and EKG  Check Echo   Card eval appreciated   ischemic W/U   ASA and statin   BP control       # Hx of HFrEF   Check Echo   D/C lisinopril, Added coreg   will add entresto prior to discharge monitor electrolytes  avoid over load      # DM   sliding scale  diab diet  check A1C     # HLD   lipid panel   statin     DVT and GI PPX

## 2023-07-20 ENCOUNTER — TRANSCRIPTION ENCOUNTER (OUTPATIENT)
Age: 68
End: 2023-07-20

## 2023-07-20 LAB
ALBUMIN SERPL ELPH-MCNC: 4 G/DL — SIGNIFICANT CHANGE UP (ref 3.3–5)
ALP SERPL-CCNC: 67 U/L — SIGNIFICANT CHANGE UP (ref 40–120)
ALT FLD-CCNC: 16 U/L — SIGNIFICANT CHANGE UP (ref 4–41)
ANION GAP SERPL CALC-SCNC: 8 MMOL/L — SIGNIFICANT CHANGE UP (ref 7–14)
AST SERPL-CCNC: 22 U/L — SIGNIFICANT CHANGE UP (ref 4–40)
BASOPHILS # BLD AUTO: 0.04 K/UL — SIGNIFICANT CHANGE UP (ref 0–0.2)
BASOPHILS NFR BLD AUTO: 0.5 % — SIGNIFICANT CHANGE UP (ref 0–2)
BILIRUB SERPL-MCNC: 0.5 MG/DL — SIGNIFICANT CHANGE UP (ref 0.2–1.2)
BUN SERPL-MCNC: 6 MG/DL — LOW (ref 7–23)
CALCIUM SERPL-MCNC: 9.2 MG/DL — SIGNIFICANT CHANGE UP (ref 8.4–10.5)
CHLORIDE SERPL-SCNC: 104 MMOL/L — SIGNIFICANT CHANGE UP (ref 98–107)
CHOLEST SERPL-MCNC: 119 MG/DL — SIGNIFICANT CHANGE UP
CO2 SERPL-SCNC: 23 MMOL/L — SIGNIFICANT CHANGE UP (ref 22–31)
CREAT SERPL-MCNC: 0.77 MG/DL — SIGNIFICANT CHANGE UP (ref 0.5–1.3)
EGFR: 98 ML/MIN/1.73M2 — SIGNIFICANT CHANGE UP
EOSINOPHIL # BLD AUTO: 0.09 K/UL — SIGNIFICANT CHANGE UP (ref 0–0.5)
EOSINOPHIL NFR BLD AUTO: 1.2 % — SIGNIFICANT CHANGE UP (ref 0–6)
GLUCOSE BLDC GLUCOMTR-MCNC: 139 MG/DL — HIGH (ref 70–99)
GLUCOSE BLDC GLUCOMTR-MCNC: 176 MG/DL — HIGH (ref 70–99)
GLUCOSE BLDC GLUCOMTR-MCNC: 177 MG/DL — HIGH (ref 70–99)
GLUCOSE BLDC GLUCOMTR-MCNC: 182 MG/DL — HIGH (ref 70–99)
GLUCOSE SERPL-MCNC: 140 MG/DL — HIGH (ref 70–99)
HCT VFR BLD CALC: 41.5 % — SIGNIFICANT CHANGE UP (ref 39–50)
HCV AB S/CO SERPL IA: 0.11 S/CO — SIGNIFICANT CHANGE UP (ref 0–0.99)
HCV AB SERPL-IMP: SIGNIFICANT CHANGE UP
HDLC SERPL-MCNC: 49 MG/DL — SIGNIFICANT CHANGE UP
HGB BLD-MCNC: 14.4 G/DL — SIGNIFICANT CHANGE UP (ref 13–17)
IANC: 5.12 K/UL — SIGNIFICANT CHANGE UP (ref 1.8–7.4)
IMM GRANULOCYTES NFR BLD AUTO: 0.8 % — SIGNIFICANT CHANGE UP (ref 0–0.9)
LIPID PNL WITH DIRECT LDL SERPL: 40 MG/DL — SIGNIFICANT CHANGE UP
LYMPHOCYTES # BLD AUTO: 1.5 K/UL — SIGNIFICANT CHANGE UP (ref 1–3.3)
LYMPHOCYTES # BLD AUTO: 20 % — SIGNIFICANT CHANGE UP (ref 13–44)
MCHC RBC-ENTMCNC: 30.9 PG — SIGNIFICANT CHANGE UP (ref 27–34)
MCHC RBC-ENTMCNC: 34.7 GM/DL — SIGNIFICANT CHANGE UP (ref 32–36)
MCV RBC AUTO: 89.1 FL — SIGNIFICANT CHANGE UP (ref 80–100)
MONOCYTES # BLD AUTO: 0.69 K/UL — SIGNIFICANT CHANGE UP (ref 0–0.9)
MONOCYTES NFR BLD AUTO: 9.2 % — SIGNIFICANT CHANGE UP (ref 2–14)
NEUTROPHILS # BLD AUTO: 5.12 K/UL — SIGNIFICANT CHANGE UP (ref 1.8–7.4)
NEUTROPHILS NFR BLD AUTO: 68.3 % — SIGNIFICANT CHANGE UP (ref 43–77)
NON HDL CHOLESTEROL: 70 MG/DL — SIGNIFICANT CHANGE UP
NRBC # BLD: 0 /100 WBCS — SIGNIFICANT CHANGE UP (ref 0–0)
NRBC # FLD: 0 K/UL — SIGNIFICANT CHANGE UP (ref 0–0)
PLATELET # BLD AUTO: 285 K/UL — SIGNIFICANT CHANGE UP (ref 150–400)
POTASSIUM SERPL-MCNC: 3.7 MMOL/L — SIGNIFICANT CHANGE UP (ref 3.5–5.3)
POTASSIUM SERPL-SCNC: 3.7 MMOL/L — SIGNIFICANT CHANGE UP (ref 3.5–5.3)
PROT SERPL-MCNC: 7.3 G/DL — SIGNIFICANT CHANGE UP (ref 6–8.3)
RBC # BLD: 4.66 M/UL — SIGNIFICANT CHANGE UP (ref 4.2–5.8)
RBC # FLD: 11.9 % — SIGNIFICANT CHANGE UP (ref 10.3–14.5)
SODIUM SERPL-SCNC: 135 MMOL/L — SIGNIFICANT CHANGE UP (ref 135–145)
T3 SERPL-MCNC: 107 NG/DL — SIGNIFICANT CHANGE UP (ref 80–200)
T4 FREE SERPL-MCNC: 1.1 NG/DL — SIGNIFICANT CHANGE UP (ref 0.9–1.8)
TRIGL SERPL-MCNC: 150 MG/DL — HIGH
TSH SERPL-MCNC: 4.61 UIU/ML — HIGH (ref 0.27–4.2)
WBC # BLD: 7.5 K/UL — SIGNIFICANT CHANGE UP (ref 3.8–10.5)
WBC # FLD AUTO: 7.5 K/UL — SIGNIFICANT CHANGE UP (ref 3.8–10.5)

## 2023-07-20 PROCEDURE — 93010 ELECTROCARDIOGRAM REPORT: CPT

## 2023-07-20 RX ORDER — SODIUM CHLORIDE 9 MG/ML
1000 INJECTION INTRAMUSCULAR; INTRAVENOUS; SUBCUTANEOUS
Refills: 0 | Status: DISCONTINUED | OUTPATIENT
Start: 2023-07-20 | End: 2023-07-21

## 2023-07-20 RX ORDER — SACUBITRIL AND VALSARTAN 24; 26 MG/1; MG/1
1 TABLET, FILM COATED ORAL
Qty: 60 | Refills: 0
Start: 2023-07-20 | End: 2023-08-18

## 2023-07-20 RX ADMIN — Medication 1: at 17:45

## 2023-07-20 RX ADMIN — HEPARIN SODIUM 5000 UNIT(S): 5000 INJECTION INTRAVENOUS; SUBCUTANEOUS at 05:43

## 2023-07-20 RX ADMIN — Medication 81 MILLIGRAM(S): at 10:31

## 2023-07-20 RX ADMIN — CARVEDILOL PHOSPHATE 3.12 MILLIGRAM(S): 80 CAPSULE, EXTENDED RELEASE ORAL at 05:43

## 2023-07-20 RX ADMIN — PANTOPRAZOLE SODIUM 40 MILLIGRAM(S): 20 TABLET, DELAYED RELEASE ORAL at 05:43

## 2023-07-20 RX ADMIN — HEPARIN SODIUM 5000 UNIT(S): 5000 INJECTION INTRAVENOUS; SUBCUTANEOUS at 22:30

## 2023-07-20 RX ADMIN — CLOPIDOGREL BISULFATE 75 MILLIGRAM(S): 75 TABLET, FILM COATED ORAL at 10:31

## 2023-07-20 RX ADMIN — CARVEDILOL PHOSPHATE 3.12 MILLIGRAM(S): 80 CAPSULE, EXTENDED RELEASE ORAL at 17:12

## 2023-07-20 RX ADMIN — ATORVASTATIN CALCIUM 20 MILLIGRAM(S): 80 TABLET, FILM COATED ORAL at 22:30

## 2023-07-20 RX ADMIN — SODIUM CHLORIDE 75 MILLILITER(S): 9 INJECTION INTRAMUSCULAR; INTRAVENOUS; SUBCUTANEOUS at 13:54

## 2023-07-20 NOTE — DISCHARGE NOTE PROVIDER - CARE PROVIDER_API CALL
Your, PCP  Phone: (   )    -  Fax: (   )    -  Follow Up Time: 2 weeks    Your, Cardiologist  Phone: (   )    -  Fax: (   )    -  Follow Up Time: 2 weeks

## 2023-07-20 NOTE — DISCHARGE NOTE PROVIDER - NSDCMRMEDTOKEN_GEN_ALL_CORE_FT
sacubitril-valsartan 24 mg-26 mg oral tablet: 1 tab(s) orally 2 times a day   aspirin 81 mg oral tablet, chewable: 1 tab(s) orally once a day  atorvastatin 20 mg oral tablet: 1 tab(s) orally once a day (at bedtime)  carvedilol 3.125 mg oral tablet: 1 tab(s) orally every 12 hours  clopidogrel 75 mg oral tablet: 1 tab(s) orally once a day  sacubitril-valsartan 24 mg-26 mg oral tablet: 1 tab(s) orally 2 times a day   aspirin 81 mg oral tablet, chewable: 1 tab(s) orally once a day  atorvastatin 20 mg oral tablet: 1 tab(s) orally once a day (at bedtime)  carvedilol 3.125 mg oral tablet: 1 tab(s) orally every 12 hours  clopidogrel 75 mg oral tablet: 1 tab(s) orally once a day  pantoprazole 40 mg oral delayed release tablet: 1 tab(s) orally once a day (before a meal)  sacubitril-valsartan 24 mg-26 mg oral tablet: 1 tab(s) orally 2 times a day

## 2023-07-20 NOTE — DISCHARGE NOTE PROVIDER - CARE PROVIDERS DIRECT ADDRESSES
Patient position PRONE ON IR TABLE. Patient BACK WIDELY prepped and draped per unit standard.    Safety straps applied:N/A   ,DirectAddress_Unknown,DirectAddress_Unknown

## 2023-07-20 NOTE — DISCHARGE NOTE PROVIDER - PROVIDER TOKENS
FREE:[LAST:[Your],FIRST:[PCP],PHONE:[(   )    -],FAX:[(   )    -],FOLLOWUP:[2 weeks]],FREE:[LAST:[Your],FIRST:[Cardiologist],PHONE:[(   )    -],FAX:[(   )    -],FOLLOWUP:[2 weeks]]

## 2023-07-20 NOTE — DISCHARGE NOTE PROVIDER - NSFOLLOWUPCLINICS_GEN_ALL_ED_FT
Cardiology at [facility]  Cardiology  [address] [place], [place] [address]  Phone: [phone]  Fax:

## 2023-07-20 NOTE — DISCHARGE NOTE PROVIDER - HOSPITAL COURSE
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     # Chest pain  Chest pain  -plan to add entresto prior to discharge monitor electrolytes  Echo: EF 40-45%  Moderate segmental left ventricular systolic dysfunction with hypokinesis of the anterior and lateral walls. grossly normal right ventricular systolic function.  - LHC 7/19 and 7/20, s/p 2 stents       7/19 LHC: LAD of 70% and RCA of 90% s/p 1 SNEHAL. On Aspirin and Plavix. RRA access site.  7/20 LHC: mLAD 80% x1 SNEHAL, RRA accessed    Carotid duplex: Summary/Impressions:  Minimal heterogenous plaque noted within the proximal  right and left internal carotid arteries, consistent with  1-15% stenoses.  No hemodynamically significant stenoses noted.      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     # Chest pain  Chest pain  -plan to add entresto prior to discharge monitor electrolytes  Echo: EF 40-45%  Moderate segmental left ventricular systolic dysfunction with hypokinesis of the anterior and lateral walls. grossly normal right ventricular systolic function.  - LHC 7/19 and 7/20, s/p 2 stents      7/19 LHC: LAD of 70% and RCA of 90% s/p 1 SNEHAL. On Aspirin and Plavix. RRA access site.     7/20 LHC: mLAD 80% x1 SNEHAL, RRA accessed    # Dizziness  Carotid duplex: Summary/Impressions: Minimal heterogenous plaque noted within the proximal right and left internal carotid arteries, consistent with 1-15% stenoses.  No hemodynamically significant stenoses noted.    # Hx of HFrEF   D/C lisinopril, Added coreg   will add entresto prior to discharge monitor electrolytes  avoid over load      # DM   sliding scale  diab diet  check A1C     # HLD   lipid panel   statin     DVT and GI PPX     On 7/21/2023, discussed with Dr. Hoang, patient is medically cleared and optimized for discharge today to home. All medications were reviewed with attending, and any new/required prescriptions were sent to mutually agreed upon pharmacy.    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     # Chest pain  Chest pain  -plan to add entresto prior to discharge monitor electrolytes  Echo: EF 40-45%  Moderate segmental left ventricular systolic dysfunction with hypokinesis of the anterior and lateral walls. grossly normal right ventricular systolic function.  - LHC 7/19 and 7/20, s/p 2 stents      7/19 LHC: LAD of 70% and RCA of 90% s/p 1 SNEHAL. On Aspirin and Plavix. RRA access site.     7/20 LHC: mLAD 80% x1 SNEHAL, RRA accessed    # Dizziness  Carotid duplex: Summary/Impressions: Minimal heterogenous plaque noted within the proximal right and left internal carotid arteries, consistent with 1-15% stenoses.  No hemodynamically significant stenoses noted.    # Hx of HFrEF   D/C lisinopril, Added coreg   started entresto, monitor electrolytes  avoid overload      # DM   sliding scale  diabetic diet  A1C 6.9%    # HLD   lipid panel   c/w statin     DVT and GI PPX     On 7/21/2023, discussed with Dr. Hoang, patient is medically cleared and optimized for discharge today to home. All medications were reviewed with attending, and any new/required prescriptions were sent to mutually agreed upon pharmacy.

## 2023-07-20 NOTE — DISCHARGE NOTE PROVIDER - NSDCCPCAREPLAN_GEN_ALL_CORE_FT
PRINCIPAL DISCHARGE DIAGNOSIS  Diagnosis: Chest pain  Assessment and Plan of Treatment: You underwent a heart cath on 7/19 and 7/20. You had 2 stents placed. Continue aspirin and Plavix, do not stop unless instructed by your physician.  Continue low salt, fat, cholesterol and carbohydrate diet. Follow up with cardiologist in 1-2 weeks and primary care physician's routine appointment        SECONDARY DISCHARGE DIAGNOSES  Diagnosis: Chronic HFrEF (heart failure with reduced ejection fraction)  Assessment and Plan of Treatment: Still evident on echocardiogram. You were started on Entresto. Continue to take your medications as prescribed. Please follow up with your primary care provider.       PRINCIPAL DISCHARGE DIAGNOSIS  Diagnosis: Chest pain  Assessment and Plan of Treatment: You underwent a heart cath on 7/19 and 7/20. You had 2 stents placed. Continue aspirin and Plavix, do not stop unless instructed by your physician.  Continue low salt, fat, cholesterol and carbohydrate diet. Continue with statin. Follow up with cardiologist in 1-2 weeks and primary care physician's routine appointment        SECONDARY DISCHARGE DIAGNOSES  Diagnosis: Chronic HFrEF (heart failure with reduced ejection fraction)  Assessment and Plan of Treatment: Still evident on echocardiogram. You were started on Entresto. Continue to take your medications as prescribed. Please follow up with your primary care provider.      Diagnosis: Diabetes mellitus  Assessment and Plan of Treatment: Your HgbA1c: 6.9%  Continue with diabetic diet and follow up with your PCP for monitoring of your A1c levels and to assess needs for any medication inititiation.

## 2023-07-21 ENCOUNTER — TRANSCRIPTION ENCOUNTER (OUTPATIENT)
Age: 68
End: 2023-07-21

## 2023-07-21 VITALS
RESPIRATION RATE: 18 BRPM | OXYGEN SATURATION: 100 % | HEART RATE: 87 BPM | SYSTOLIC BLOOD PRESSURE: 148 MMHG | DIASTOLIC BLOOD PRESSURE: 94 MMHG | TEMPERATURE: 99 F

## 2023-07-21 LAB
ANION GAP SERPL CALC-SCNC: 12 MMOL/L — SIGNIFICANT CHANGE UP (ref 7–14)
BUN SERPL-MCNC: 5 MG/DL — LOW (ref 7–23)
CALCIUM SERPL-MCNC: 9 MG/DL — SIGNIFICANT CHANGE UP (ref 8.4–10.5)
CHLORIDE SERPL-SCNC: 101 MMOL/L — SIGNIFICANT CHANGE UP (ref 98–107)
CO2 SERPL-SCNC: 20 MMOL/L — LOW (ref 22–31)
CREAT SERPL-MCNC: 0.77 MG/DL — SIGNIFICANT CHANGE UP (ref 0.5–1.3)
EGFR: 98 ML/MIN/1.73M2 — SIGNIFICANT CHANGE UP
GLUCOSE BLDC GLUCOMTR-MCNC: 167 MG/DL — HIGH (ref 70–99)
GLUCOSE SERPL-MCNC: 181 MG/DL — HIGH (ref 70–99)
HCT VFR BLD CALC: 39.8 % — SIGNIFICANT CHANGE UP (ref 39–50)
HGB BLD-MCNC: 13.9 G/DL — SIGNIFICANT CHANGE UP (ref 13–17)
MAGNESIUM SERPL-MCNC: 1.7 MG/DL — SIGNIFICANT CHANGE UP (ref 1.6–2.6)
MCHC RBC-ENTMCNC: 31 PG — SIGNIFICANT CHANGE UP (ref 27–34)
MCHC RBC-ENTMCNC: 34.9 GM/DL — SIGNIFICANT CHANGE UP (ref 32–36)
MCV RBC AUTO: 88.6 FL — SIGNIFICANT CHANGE UP (ref 80–100)
NRBC # BLD: 0 /100 WBCS — SIGNIFICANT CHANGE UP (ref 0–0)
NRBC # FLD: 0 K/UL — SIGNIFICANT CHANGE UP (ref 0–0)
PHOSPHATE SERPL-MCNC: 2.4 MG/DL — LOW (ref 2.5–4.5)
PLATELET # BLD AUTO: 280 K/UL — SIGNIFICANT CHANGE UP (ref 150–400)
POTASSIUM SERPL-MCNC: 4.3 MMOL/L — SIGNIFICANT CHANGE UP (ref 3.5–5.3)
POTASSIUM SERPL-SCNC: 4.3 MMOL/L — SIGNIFICANT CHANGE UP (ref 3.5–5.3)
RBC # BLD: 4.49 M/UL — SIGNIFICANT CHANGE UP (ref 4.2–5.8)
RBC # FLD: 12 % — SIGNIFICANT CHANGE UP (ref 10.3–14.5)
SODIUM SERPL-SCNC: 133 MMOL/L — LOW (ref 135–145)
WBC # BLD: 6.64 K/UL — SIGNIFICANT CHANGE UP (ref 3.8–10.5)
WBC # FLD AUTO: 6.64 K/UL — SIGNIFICANT CHANGE UP (ref 3.8–10.5)

## 2023-07-21 RX ORDER — CARVEDILOL PHOSPHATE 80 MG/1
1 CAPSULE, EXTENDED RELEASE ORAL
Qty: 60 | Refills: 0
Start: 2023-07-21 | End: 2023-08-19

## 2023-07-21 RX ORDER — ATORVASTATIN CALCIUM 80 MG/1
1 TABLET, FILM COATED ORAL
Qty: 30 | Refills: 0
Start: 2023-07-21 | End: 2023-08-19

## 2023-07-21 RX ORDER — CLOPIDOGREL BISULFATE 75 MG/1
1 TABLET, FILM COATED ORAL
Qty: 30 | Refills: 0
Start: 2023-07-21 | End: 2023-08-19

## 2023-07-21 RX ORDER — ASPIRIN/CALCIUM CARB/MAGNESIUM 324 MG
1 TABLET ORAL
Qty: 30 | Refills: 0
Start: 2023-07-21 | End: 2023-08-19

## 2023-07-21 RX ORDER — PANTOPRAZOLE SODIUM 20 MG/1
1 TABLET, DELAYED RELEASE ORAL
Qty: 30 | Refills: 0
Start: 2023-07-21 | End: 2023-08-19

## 2023-07-21 RX ADMIN — Medication 81 MILLIGRAM(S): at 11:43

## 2023-07-21 RX ADMIN — CLOPIDOGREL BISULFATE 75 MILLIGRAM(S): 75 TABLET, FILM COATED ORAL at 11:43

## 2023-07-21 RX ADMIN — HEPARIN SODIUM 5000 UNIT(S): 5000 INJECTION INTRAVENOUS; SUBCUTANEOUS at 06:40

## 2023-07-21 RX ADMIN — PANTOPRAZOLE SODIUM 40 MILLIGRAM(S): 20 TABLET, DELAYED RELEASE ORAL at 06:39

## 2023-07-21 RX ADMIN — CARVEDILOL PHOSPHATE 3.12 MILLIGRAM(S): 80 CAPSULE, EXTENDED RELEASE ORAL at 06:39

## 2023-07-21 RX ADMIN — Medication 1: at 08:50

## 2023-07-21 RX ADMIN — SACUBITRIL AND VALSARTAN 1 TABLET(S): 24; 26 TABLET, FILM COATED ORAL at 06:39

## 2023-07-21 NOTE — DISCHARGE NOTE NURSING/CASE MANAGEMENT/SOCIAL WORK - PATIENT PORTAL LINK FT
You can access the FollowMyHealth Patient Portal offered by [facility] by registering at the following website: [email or web address]. By joining [facility]’s FollowMyHealth portal, you will also be able to view your health information using other applications (apps) compatible with our system.

## 2023-07-21 NOTE — PHARMACOTHERAPY INTERVENTION NOTE - COMMENTS
Discharge medications reviewed with the patient and his wife. Current medication schedule was discussed in detail including: medication name, indication, dose, administration times, treatment duration, side effects, and special instructions. Discussed that Entresto will replace lisinopril. Also discussed CHF management. Patient counseled on heart failure medication indications, administration, and side effects. Also discussed fluid and salt restrictions, and maintaining a log of daily weights. Discussed warning signs of fluid overload (SOB, orthopnea, KIRK, edema, etc.) and when to seek medical attention. Patient and wife verbalized understanding.  Questions and concerns were answered and addressed. Patient provided with educational handout and CHF booklet.    New medications: Entresto, Plavix, Protonix    Delphine VilaD, [facility]  Clinical Pharmacy Specialist  [id number]

## 2023-07-21 NOTE — DISCHARGE NOTE NURSING/CASE MANAGEMENT/SOCIAL WORK - NSDCPEFALRISK_GEN_ALL_CORE
For information on Fall & Injury Prevention, visit: [email or web address] OR  [email or web address] OR  https://www.cdc.gov/steadi/patient.html

## 2023-07-21 NOTE — PROGRESS NOTE ADULT - SUBJECTIVE AND OBJECTIVE BOX
Subjective: Patient seen and examined. No new events except as noted.     SUBJECTIVE/ROS:  No chest pain, dyspnea, palpitation, or dizziness.       MEDICATIONS:  MEDICATIONS  (STANDING):  aspirin  chewable 81 milliGRAM(s) Oral daily  atorvastatin 20 milliGRAM(s) Oral at bedtime  carvedilol 3.125 milliGRAM(s) Oral every 12 hours  clopidogrel Tablet 75 milliGRAM(s) Oral daily  dextrose 5%. 1000 milliLiter(s) (50 mL/Hr) IV Continuous <Continuous>  dextrose 50% Injectable 25 Gram(s) IV Push once  glucagon  Injectable 1 milliGRAM(s) IntraMuscular once  heparin   Injectable 5000 Unit(s) SubCutaneous every 8 hours  insulin lispro (ADMELOG) corrective regimen sliding scale   SubCutaneous at bedtime  insulin lispro (ADMELOG) corrective regimen sliding scale   SubCutaneous three times a day before meals  pantoprazole    Tablet 40 milliGRAM(s) Oral before breakfast  sodium chloride 0.9%. 1000 milliLiter(s) (75 mL/Hr) IV Continuous <Continuous>      PHYSICAL EXAM:  T(C): 36.6 (07-20-23 @ 05:39), Max: 36.8 (07-19-23 @ 14:09)  HR: 86 (07-20-23 @ 05:39) (83 - 93)  BP: 142/95 (07-20-23 @ 05:39) (130/80 - 157/94)  RR: 17 (07-20-23 @ 05:39) (17 - 18)  SpO2: 98% (07-20-23 @ 05:39) (98% - 100%)  Wt(kg): --  I&O's Summary    Height (cm): 170.2 (07-19 @ 17:56)  Weight (kg): 88.904 (07-19 @ 17:56)  BMI (kg/m2): 30.7 (07-19 @ 17:56)  BSA (m2): 2 (07-19 @ 17:56)      JVP: Normal  Neck: supple  Lung: clear   CV: S1 S2 , Murmur:  Abd: soft  Ext: No edema  neuro: Awake / alert  Psych: flat affect  Skin: normal``    LABS/DATA:    CARDIAC MARKERS:                                14.4   7.50  )-----------( 285      ( 20 Jul 2023 06:04 )             41.5     07-20    135  |  104  |  6<L>  ----------------------------<  140<H>  3.7   |  23  |  0.77    Ca    9.2      20 Jul 2023 06:04    TPro  7.3  /  Alb  4.0  /  TBili  0.5  /  DBili  x   /  AST  22  /  ALT  16  /  AlkPhos  67  07-20    proBNP:   Lipid Profile:   HgA1c:   TSH: Thyroid Stimulating Hormone, Serum: 4.61 uIU/mL (07-20 @ 06:04)      TELE:  EKG:    < from: [facility] Duplex Carotid Arteries, Bilateral. (07.19.23 @ 10:46) >    Patient name: SAHIL RAMIRES  Date of test: 7/19/2023  MR#: [id number]  [facility] #: [id number]    Location: [facility] Physician(s): , Not Available Doctor, MD  Interpreted by: Jose F Yoon MD, [facility], ELIZABETH, SAUNDRA  Tech: Lidia Irwin LIZA  Type of Test:Carotid Doppler Evaluation  ------------------------------------------------------------------------  Procedure: Duplex Real-time grayscale/color Doppler  ultrasonography used to interrogate extracranial arteries  bilaterally.  Indications: Syncope and collapse (R55)  ------------------------------------------------------------------------  VELOCITY:  ------------------------------------------------------------------------  RIGHT:    Subclavian: 69 /    Prox CCA: 66/11    Mid CCA: 97/15    Dist CCA: 73/13    Prox ICA: 52/14    Mid ICA: 50 / 16    Distal ICA: 61 / 19    ECA: 68 /    Vertebral: 27 / 8    ICA/CCA: 0.8  ------------------------------------------------------------------------    Subclavian: Normal    CCA Plaque: Mild intimal    ICA Plaque: Minimal    Vertebral: Antegrade flow  ------------------------------------------------------------------------  LEFT:    Subclavian: 59 / 6    Prox CCA: 135/19    Mid CCA: 90/14    Dist CCA: 95/16    Prox ICA: 40/12    Mid ICA: 54 / 15    Distal ICA: 71 / 26    ECA: 93 /    Vertebral: 45 / 19    ICA/CCA: 0.5  ------------------------------------------------------------------------    Subclavian: Normal    CCA Plaque: Mild intimal    ICA Plaque: Minimal    Vertebral: Antegrade flow  ------------------------------------------------------------------------  Right Findings: Right Common Carotid Artery:  There is  mild intimal thickening noted throughout the right common  carotid artery. No hemodynamically significant stenosis.  Right Internal Carotid Artery:  B-mode and spectral  analyses consistent with a diameter reduction of 1-15%.  There is minimal heterogenous plaque within the proximal  vessel. No hemodynamically significant stenosis.  Right Vertebral Artery:  Antegrade flow with normal  velocities.  Left Findings: Left Common Carotid Artery:  There is mild  intimal thickening noted throughout the vessel. No  hemodynamically significant stenosis.  Left Internal Carotid Artery:  B-mode and spectral  analyses consistent with diameter reduction of 1-15%.  There is minimal heterogenous plaque within the proximal  vessel. No hemodynamically significant stenosis.  Left Vertebral Artery:  Antegrade flow with normal  velocities.  ------------------------------------------------------------------------  Summary/Impressions:  Minimal heterogenous plaque noted within the proximal  right and left internal carotid arteries, consistent with  1-15% stenoses.  No hemodynamically significant stenoses noted.  ------------------------------------------------------------------------  Confirmed on  7/19/2023 - 2:26 PM by Jose F Yoon MD, [facility],  [facility], [facility]  By signing this report, the attending physician certifies  that he or she has personally supervised and interpreted  the vascularstudy and has reviewed and or edited and  agrees with the written comments contained within the  report.    < end of copied text >      
    Subjective: Patient seen and examined. No new events except as noted.     SUBJECTIVE/ROS:  No chest pain, dyspnea, palpitation, or dizziness.       MEDICATIONS:  MEDICATIONS  (STANDING):  aspirin  chewable 81 milliGRAM(s) Oral daily  atorvastatin 20 milliGRAM(s) Oral at bedtime  carvedilol 3.125 milliGRAM(s) Oral every 12 hours  clopidogrel Tablet 75 milliGRAM(s) Oral daily  dextrose 5%. 1000 milliLiter(s) (50 mL/Hr) IV Continuous <Continuous>  dextrose 50% Injectable 25 Gram(s) IV Push once  glucagon  Injectable 1 milliGRAM(s) IntraMuscular once  heparin   Injectable 5000 Unit(s) SubCutaneous every 8 hours  insulin lispro (ADMELOG) corrective regimen sliding scale   SubCutaneous three times a day before meals  insulin lispro (ADMELOG) corrective regimen sliding scale   SubCutaneous at bedtime  pantoprazole    Tablet 40 milliGRAM(s) Oral before breakfast  sacubitril 24 mG/valsartan 26 mG 1 Tablet(s) Oral two times a day  sodium chloride 0.9%. 1000 milliLiter(s) (75 mL/Hr) IV Continuous <Continuous>  sodium chloride 0.9%. 1000 milliLiter(s) (75 mL/Hr) IV Continuous <Continuous>      PHYSICAL EXAM:  T(C): 37.1 (07-21-23 @ 06:40), Max: 37.1 (07-21-23 @ 06:40)  HR: 87 (07-21-23 @ 06:40) (83 - 87)  BP: 148/94 (07-21-23 @ 06:40) (145/90 - 153/89)  RR: 18 (07-21-23 @ 06:40) (17 - 18)  SpO2: 100% (07-21-23 @ 06:40) (99% - 100%)  Wt(kg): --  I&O's Summary          JVP: Normal  Neck: supple  Lung: clear   CV: S1 S2 , Murmur:  Abd: soft  Ext: No edema  neuro: Awake / alert  Psych: flat affect  Skin: normal``    LABS/DATA:    CARDIAC MARKERS:                                13.9   6.64  )-----------( 280      ( 21 Jul 2023 07:25 )             39.8     07-21    133<L>  |  101  |  5<L>  ----------------------------<  181<H>  4.3   |  20<L>  |  0.77    Ca    9.0      21 Jul 2023 07:25  Phos  2.4     07-21  Mg     1.70     07-21    TPro  7.3  /  Alb  4.0  /  TBili  0.5  /  DBili  x   /  AST  22  /  ALT  16  /  AlkPhos  67  07-20    proBNP:   Lipid Profile:   HgA1c:   TSH:     TELE:  EKG:        
Name of Patient : SAHIL RAMIRES  MRN: [id number]  Date of visit: 07-21-23 @ 12:45      Subjective: Patient seen and examined. No new events except as noted.   Doing okay  S/P Staged PCI     REVIEW OF SYSTEMS:    CONSTITUTIONAL: No weakness, fevers or chills  EYES/ENT: No visual changes;  No vertigo or throat pain   NECK: No pain or stiffness  RESPIRATORY: No cough, wheezing, hemoptysis; No shortness of breath  CARDIOVASCULAR: No chest pain or palpitations  GASTROINTESTINAL: No abdominal or epigastric pain. No nausea, vomiting, or hematemesis; No diarrhea or constipation. No melena or hematochezia.  GENITOURINARY: No dysuria, frequency or hematuria  NEUROLOGICAL: No numbness or weakness  SKIN: No itching, burning, rashes, or lesions   All other review of systems is negative unless indicated above.    MEDICATIONS:  MEDICATIONS  (STANDING):  aspirin  chewable 81 milliGRAM(s) Oral daily  atorvastatin 20 milliGRAM(s) Oral at bedtime  carvedilol 3.125 milliGRAM(s) Oral every 12 hours  clopidogrel Tablet 75 milliGRAM(s) Oral daily  dextrose 5%. 1000 milliLiter(s) (50 mL/Hr) IV Continuous <Continuous>  dextrose 50% Injectable 25 Gram(s) IV Push once  glucagon  Injectable 1 milliGRAM(s) IntraMuscular once  heparin   Injectable 5000 Unit(s) SubCutaneous every 8 hours  insulin lispro (ADMELOG) corrective regimen sliding scale   SubCutaneous at bedtime  insulin lispro (ADMELOG) corrective regimen sliding scale   SubCutaneous three times a day before meals  pantoprazole    Tablet 40 milliGRAM(s) Oral before breakfast  sacubitril 24 mG/valsartan 26 mG 1 Tablet(s) Oral two times a day  sodium chloride 0.9%. 1000 milliLiter(s) (75 mL/Hr) IV Continuous <Continuous>  sodium chloride 0.9%. 1000 milliLiter(s) (75 mL/Hr) IV Continuous <Continuous>      PHYSICAL EXAM:  T(C): 37.1 (07-21-23 @ 06:40), Max: 37.1 (07-21-23 @ 06:40)  HR: 87 (07-21-23 @ 06:40) (83 - 87)  BP: 148/94 (07-21-23 @ 06:40) (145/90 - 153/89)  RR: 18 (07-21-23 @ 06:40) (17 - 18)  SpO2: 100% (07-21-23 @ 06:40) (99% - 100%)  Wt(kg): --  I&O's Summary        Appearance: Normal	  HEENT:  PERRLA   Lymphatic: No lymphadenopathy   Cardiovascular: Normal S1 S2, no JVD  Respiratory: normal effort , clear  Gastrointestinal:  Soft, Non-tender  Skin: No rashes,  warm to touch  Psychiatry:  Mood & affect appropriate  Musculuskeletal: No edema    recent labs, Imaging and EKGs personally reviewed                           13.9   6.64  )-----------( 280      ( 21 Jul 2023 07:25 )             39.8               07-21    133<L>  |  101  |  5<L>  ----------------------------<  181<H>  4.3   |  20<L>  |  0.77    Ca    9.0      21 Jul 2023 07:25  Phos  2.4     07-21  Mg     1.70     07-21    TPro  7.3  /  Alb  4.0  /  TBili  0.5  /  DBili  x   /  AST  22  /  ALT  16  /  AlkPhos  67  07-20                       Urinalysis Basic - ( 21 Jul 2023 07:25 )    Color: x / Appearance: x / SG: x / pH: x  Gluc: 181 mg/dL / Ketone: x  / Bili: x / Urobili: x   Blood: x / Protein: x / Nitrite: x   Leuk Esterase: x / RBC: x / WBC x   Sq Epi: x / Non Sq Epi: x / Bacteria: x                    
Name of Patient : SAHIL RAMIRES  MRN: [id number]  Date of visit: 07-20-23       Subjective: Patient seen and examined. No new events except as noted.   Doing okay   S/P Cath, stent placed second stage today     REVIEW OF SYSTEMS:    CONSTITUTIONAL: No weakness, fevers or chills  EYES/ENT: No visual changes;  No vertigo or throat pain   NECK: No pain or stiffness  RESPIRATORY: No cough, wheezing, hemoptysis; No shortness of breath  CARDIOVASCULAR: No chest pain or palpitations  GASTROINTESTINAL: No abdominal or epigastric pain. No nausea, vomiting, or hematemesis; No diarrhea or constipation. No melena or hematochezia.  GENITOURINARY: No dysuria, frequency or hematuria  NEUROLOGICAL: No numbness or weakness  SKIN: No itching, burning, rashes, or lesions   All other review of systems is negative unless indicated above.    MEDICATIONS:  MEDICATIONS  (STANDING):  aspirin  chewable 81 milliGRAM(s) Oral daily  atorvastatin 20 milliGRAM(s) Oral at bedtime  carvedilol 3.125 milliGRAM(s) Oral every 12 hours  clopidogrel Tablet 75 milliGRAM(s) Oral daily  dextrose 5%. 1000 milliLiter(s) (50 mL/Hr) IV Continuous <Continuous>  dextrose 50% Injectable 25 Gram(s) IV Push once  glucagon  Injectable 1 milliGRAM(s) IntraMuscular once  heparin   Injectable 5000 Unit(s) SubCutaneous every 8 hours  insulin lispro (ADMELOG) corrective regimen sliding scale   SubCutaneous three times a day before meals  insulin lispro (ADMELOG) corrective regimen sliding scale   SubCutaneous at bedtime  pantoprazole    Tablet 40 milliGRAM(s) Oral before breakfast  sodium chloride 0.9%. 1000 milliLiter(s) (75 mL/Hr) IV Continuous <Continuous>  sodium chloride 0.9%. 1000 milliLiter(s) (75 mL/Hr) IV Continuous <Continuous>      PHYSICAL EXAM:  T(C): 36.4 (07-20-23 @ 09:30), Max: 36.6 (07-19-23 @ 21:39)  HR: 82 (07-20-23 @ 09:30) (82 - 93)  BP: 148/103 (07-20-23 @ 09:30) (142/95 - 157/94)  RR: 18 (07-20-23 @ 09:30) (17 - 18)  SpO2: 100% (07-20-23 @ 09:30) (98% - 100%)  Wt(kg): --  I&O's Summary    Height (cm): 170.2 (07-19 @ 17:56)  Weight (kg): 88.904 (07-19 @ 17:56)  BMI (kg/m2): 30.7 (07-19 @ 17:56)  BSA (m2): 2 (07-19 @ 17:56)    Appearance: Normal	  HEENT:  PERRLA   Lymphatic: No lymphadenopathy   Cardiovascular: Normal S1 S2, no JVD  Respiratory: normal effort , clear  Gastrointestinal:  Soft, Non-tender  Skin: No rashes,  warm to touch  Psychiatry:  Mood & affect appropriate  Musculuskeletal: No edema    recent labs, Imaging and EKGs personally reviewed                           14.4   7.50  )-----------( 285      ( 20 Jul 2023 06:04 )             41.5               07-20    135  |  104  |  6<L>  ----------------------------<  140<H>  3.7   |  23  |  0.77    Ca    9.2      20 Jul 2023 06:04    TPro  7.3  /  Alb  4.0  /  TBili  0.5  /  DBili  x   /  AST  22  /  ALT  16  /  AlkPhos  67  07-20                       Urinalysis Basic - ( 20 Jul 2023 06:04 )    Color: x / Appearance: x / SG: x / pH: x  Gluc: 140 mg/dL / Ketone: x  / Bili: x / Urobili: x   Blood: x / Protein: x / Nitrite: x   Leuk Esterase: x / RBC: x / WBC x   Sq Epi: x / Non Sq Epi: x / Bacteria: x

## 2023-07-21 NOTE — PROGRESS NOTE ADULT - ASSESSMENT
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  The patient pulled the car over had some water and juice fearing that it was a hypoglycemic episode and then decided not to drive for the rest of the day.  Since the chest pain persisted and he felt like he was having a heart attack he decided to come to the ED.  Denies fever, chills, shortness of breath, cough, nausea, vomiting, numbness, tingling, weakness, abdominal pain.    # Chest pain  Hx of CAD S/P PCI   R/O ACS  Serial CE and EKG  Check Echo noted   Card guyal appreciated   ASA and statin   S/P cath, staged, DAPT, follow up after cath , final report       # Hx of HFrEF   noted   D/C lisinopril, Added coreg   will add entresto prior to discharge monitor electrolytes  avoid over load      # DM   sliding scale  diab diet  check A1C     # HLD   lipid panel   statin     DVT and GI PPX 
Chest pain   ruled out for acute MI   has ischemic CM   cont BB  statin   Cath showed RCA, LAD disease s/p stent plan for stage PCI of LAD   DAPT    chronic systolic CHF  ICM   cont coreg  off lisinopril   entresto to start tomorrow     HTN  plan as above    Dm II  Monitor finger stick. Insulin coverage. Diabetic education and Diabetic diet. Consider nutrition consultation.    Dizziness  unremarkable carotid doppler  monitor on tele      
Chest pain   ruled out for acute MI   has ischemic CM   cont BB  statin   Cath showed RCA, LAD disease s/p stent plan for stage PCI of LAD today   DAPT    chronic systolic CHF  ICM   cont coreg  off lisinopril   entresto to start tomorrow     HTN  plan as above    Dm II  Monitor finger stick. Insulin coverage. Diabetic education and Diabetic diet. Consider nutrition consultation.    Dizziness  check carotid doppler  monitor on tele     Advanced care planning was discussed with patient and family.  Risks, benefits and alternatives of the cardiac treatments and medical therapy including procedures were discussed in detail and all questions were answered. Importance of compliance with medical therapy and lifestyle modification to improve cardiovascular health were addressed. Appropriate forms and patient educational materials were reviewed. 30 minutes face to face spent.    
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  The patient pulled the car over had some water and juice fearing that it was a hypoglycemic episode and then decided not to drive for the rest of the day.  Since the chest pain persisted and he felt like he was having a heart attack he decided to come to the ED.  Denies fever, chills, shortness of breath, cough, nausea, vomiting, numbness, tingling, weakness, abdominal pain.    # Chest pain  Hx of CAD S/P PCI   R/O ACS  Serial CE and EKG  Check Echo noted   Card eval appreciated   ASA and statin   S/P Cath, staged PCI     D/C planing on DAPT and statin       # Hx of HFrEF   noted   D/C lisinopril, Added coreg   will add entresto prior to discharge monitor electrolytes  avoid over load      # DM   sliding scale  diab diet  check A1C     # HLD   lipid panel   statin     DVT and GI PPX     D/C planing

## 2023-07-27 NOTE — CHART NOTE - NSCHARTNOTEFT_GEN_A_CORE
Patient is s/p cardiac cath. Patient without any complaints. RRB site is stable. No hematoma. Dressing is clean/intact/dry. 2+ radial pulse and good rap refill.  will monitor closely.
Post-Discharge Medication Review    Three attempts were made to contact patient/caregiver to offer medication counseling post-discharge. Patient/caregiver could not be reached, and medication counseling could not be completed.
Patient is s/p cardiac cath. Patient without any complaints. RRB site is stable. No hematoma. Dressing is clean/intact/dry. 2+ radial pulse and good rap refill.  will monitor closely.
Removal of Radial Band    Pulses in the right upper extremity are palpable. The patient was placed in the supine position. The insertion site was identified and the band deflated per protocol. The radial band was removed slowly. The site has no active bleeding or hematoma. Positive palpable right/left radial pulse and <3 sec capillary refill present. Patient tolerated well and denied pain, tingling or numbness. Vital signs stable. Continue monitoring as per protocol. Primary nurse at bedside to check the site before dressing was applied.         Direct pressure was applied for  _10_____ minutes.  Monitoring of the right wrists and both upper extremities including neuro-vascular checks and vital signs every 15 minutes x 4.    Complications: None/Other    Comments:

## 2023-08-01 ENCOUNTER — APPOINTMENT (OUTPATIENT)
Dept: CARDIOLOGY | Facility: CLINIC | Age: 68
End: 2023-08-01
Payer: MEDICARE

## 2023-08-01 ENCOUNTER — NON-APPOINTMENT (OUTPATIENT)
Age: 68
End: 2023-08-01

## 2023-08-01 VITALS
OXYGEN SATURATION: 96 % | WEIGHT: 204 LBS | SYSTOLIC BLOOD PRESSURE: 136 MMHG | DIASTOLIC BLOOD PRESSURE: 75 MMHG | HEART RATE: 80 BPM | BODY MASS INDEX: 32.02 KG/M2 | HEIGHT: 67 IN

## 2023-08-01 DIAGNOSIS — I25.5 ISCHEMIC CARDIOMYOPATHY: ICD-10-CM

## 2023-08-01 DIAGNOSIS — E78.5 HYPERLIPIDEMIA, UNSPECIFIED: ICD-10-CM

## 2023-08-01 DIAGNOSIS — I25.10 ATHEROSCLEROTIC HEART DISEASE OF NATIVE CORONARY ARTERY W/OUT ANGINA PECTORIS: ICD-10-CM

## 2023-08-01 DIAGNOSIS — I10 ESSENTIAL (PRIMARY) HYPERTENSION: ICD-10-CM

## 2023-08-01 PROCEDURE — 99215 OFFICE O/P EST HI 40 MIN: CPT | Mod: 25

## 2023-08-01 PROCEDURE — 93000 ELECTROCARDIOGRAM COMPLETE: CPT

## 2023-08-01 RX ORDER — CARVEDILOL 6.25 MG/1
6.25 TABLET, FILM COATED ORAL
Refills: 0 | Status: ACTIVE | COMMUNITY

## 2023-08-01 RX ORDER — GLIPIZIDE 2.5 MG/1
TABLET ORAL
Refills: 0 | Status: ACTIVE | COMMUNITY

## 2023-08-01 RX ORDER — LISINOPRIL 40 MG/1
40 TABLET ORAL
Refills: 0 | Status: ACTIVE | COMMUNITY

## 2023-08-01 RX ORDER — ASPIRIN 81 MG
81 TABLET, DELAYED RELEASE (ENTERIC COATED) ORAL
Refills: 0 | Status: ACTIVE | COMMUNITY

## 2023-08-01 RX ORDER — ATORVASTATIN CALCIUM 40 MG/1
40 TABLET, FILM COATED ORAL
Refills: 0 | Status: ACTIVE | COMMUNITY

## 2023-08-01 NOTE — REVIEW OF SYSTEMS
[Fever] : no fever [Feeling Fatigued] : feeling fatigued [Blurry Vision] : no blurred vision [Earache] : no earache [SOB] : no shortness of breath [Dyspnea on exertion] : not dyspnea during exertion

## 2023-08-01 NOTE — ASSESSMENT
[FreeTextEntry1] : CAD, no angina, continue with aspirin and clopidogrel.  Hypertension, his blood pressure is elevated, I recommended we increase the dose of carvedilol to 6.25 mg twice daily, and increase that to 12.5 mg twice daily to his prehospital admission dose that he was on for many years.  He will continue taking lisinopril 40 mg daily.  Ischemic cardiomyopathy with NYHA class I symptoms, increasing the dose of carvedilol as listed in the future may consider putting him on Entresto.  Type 2 diabetes he will continue with glipizide and follow-up with his primary care physician.  We will see him back in the clinic in 1 month.

## 2023-08-01 NOTE — PHYSICAL EXAM
[Well Developed] : well developed [Normal Conjunctiva] : normal conjunctiva [Normal Venous Pressure] : normal venous pressure [Normal S1, S2] : normal S1, S2 [No Murmur] : no murmur [Clear Lung Fields] : clear lung fields

## 2023-08-01 NOTE — CARDIOLOGY SUMMARY
[de-identified] : Ejection fraction 45% [de-identified] : Sinus rhythm left bundle branch block [de-identified] : [place] drug-eluting stent to his RCA and LAD.

## 2023-08-01 NOTE — HISTORY OF PRESENT ILLNESS
[FreeTextEntry1] : 68-year-old male history of CAD he has prior history of PCI's, most recently admitted to the hospital with chest pain underwent PCI to his right coronary artery and LAD.  Ejection fraction 45%.  Post PCI he has done well overall except that he feels that he is getting a little bit of headache when he walks and does not have the same level of energy as he used to.  Denies any chest pain or shortness of breath he claims that he did 5-7000 steps yesterday.

## 2023-08-01 NOTE — REASON FOR VISIT
[Symptom and Test Evaluation] : symptom and test evaluation [Cardiac Failure] : cardiac failure [Coronary Artery Disease] : coronary artery disease

## 2023-09-20 ENCOUNTER — APPOINTMENT (OUTPATIENT)
Dept: CARDIOLOGY | Facility: CLINIC | Age: 68
End: 2023-09-20

## 2024-03-11 RX ORDER — CLOPIDOGREL BISULFATE 75 MG/1
75 TABLET, FILM COATED ORAL DAILY
Qty: 90 | Refills: 3 | Status: ACTIVE | COMMUNITY
Start: 1900-01-01 | End: 1900-01-01

## 2024-08-01 ENCOUNTER — EMERGENCY (EMERGENCY)
Facility: HOSPITAL | Age: 69
LOS: 1 days | Discharge: HOME | End: 2024-08-01
Attending: EMERGENCY MEDICINE | Admitting: EMERGENCY MEDICINE
Payer: MEDICARE

## 2024-08-01 VITALS
RESPIRATION RATE: 16 BRPM | TEMPERATURE: 98 F | WEIGHT: 181 LBS | HEART RATE: 84 BPM | OXYGEN SATURATION: 97 % | HEIGHT: 67 IN | SYSTOLIC BLOOD PRESSURE: 129 MMHG | DIASTOLIC BLOOD PRESSURE: 81 MMHG

## 2024-08-01 VITALS
SYSTOLIC BLOOD PRESSURE: 137 MMHG | OXYGEN SATURATION: 99 % | RESPIRATION RATE: 18 BRPM | DIASTOLIC BLOOD PRESSURE: 78 MMHG | HEART RATE: 74 BPM

## 2024-08-01 LAB
ALBUMIN SERPL ELPH-MCNC: 3.7 G/DL — SIGNIFICANT CHANGE UP (ref 3.3–5)
ALP SERPL-CCNC: 57 U/L — SIGNIFICANT CHANGE UP (ref 40–120)
ALT FLD-CCNC: 15 U/L — SIGNIFICANT CHANGE UP (ref 4–41)
ANION GAP SERPL CALC-SCNC: 12 MMOL/L — SIGNIFICANT CHANGE UP (ref 7–14)
AST SERPL-CCNC: 13 U/L — SIGNIFICANT CHANGE UP (ref 4–40)
BASOPHILS # BLD AUTO: 0.06 K/UL — SIGNIFICANT CHANGE UP (ref 0–0.2)
BASOPHILS NFR BLD AUTO: 1.1 % — SIGNIFICANT CHANGE UP (ref 0–2)
BILIRUB SERPL-MCNC: 0.3 MG/DL — SIGNIFICANT CHANGE UP (ref 0.2–1.2)
BUN SERPL-MCNC: 7 MG/DL — SIGNIFICANT CHANGE UP (ref 7–23)
CALCIUM SERPL-MCNC: 8.8 MG/DL — SIGNIFICANT CHANGE UP (ref 8.4–10.5)
CHLORIDE SERPL-SCNC: 102 MMOL/L — SIGNIFICANT CHANGE UP (ref 98–107)
CO2 SERPL-SCNC: 22 MMOL/L — SIGNIFICANT CHANGE UP (ref 22–31)
CREAT SERPL-MCNC: 0.67 MG/DL — SIGNIFICANT CHANGE UP (ref 0.5–1.3)
EGFR: 102 ML/MIN/1.73M2 — SIGNIFICANT CHANGE UP
EGFR: 102 ML/MIN/1.73M2 — SIGNIFICANT CHANGE UP
EOSINOPHIL # BLD AUTO: 0.17 K/UL — SIGNIFICANT CHANGE UP (ref 0–0.5)
EOSINOPHIL NFR BLD AUTO: 3.1 % — SIGNIFICANT CHANGE UP (ref 0–6)
GLUCOSE SERPL-MCNC: 147 MG/DL — HIGH (ref 70–99)
HCT VFR BLD CALC: 38.9 % — LOW (ref 39–50)
HGB BLD-MCNC: 13 G/DL — SIGNIFICANT CHANGE UP (ref 13–17)
IANC: 3.19 K/UL — SIGNIFICANT CHANGE UP (ref 1.8–7.4)
IMM GRANULOCYTES NFR BLD AUTO: 0.4 % — SIGNIFICANT CHANGE UP (ref 0–0.9)
LYMPHOCYTES # BLD AUTO: 1.57 K/UL — SIGNIFICANT CHANGE UP (ref 1–3.3)
LYMPHOCYTES # BLD AUTO: 28.9 % — SIGNIFICANT CHANGE UP (ref 13–44)
MAGNESIUM SERPL-MCNC: 1.7 MG/DL — SIGNIFICANT CHANGE UP (ref 1.6–2.6)
MCHC RBC-ENTMCNC: 29.4 PG — SIGNIFICANT CHANGE UP (ref 27–34)
MCHC RBC-ENTMCNC: 33.4 GM/DL — SIGNIFICANT CHANGE UP (ref 32–36)
MCV RBC AUTO: 88 FL — SIGNIFICANT CHANGE UP (ref 80–100)
MONOCYTES # BLD AUTO: 0.43 K/UL — SIGNIFICANT CHANGE UP (ref 0–0.9)
MONOCYTES NFR BLD AUTO: 7.9 % — SIGNIFICANT CHANGE UP (ref 2–14)
NEUTROPHILS # BLD AUTO: 3.19 K/UL — SIGNIFICANT CHANGE UP (ref 1.8–7.4)
NEUTROPHILS NFR BLD AUTO: 58.6 % — SIGNIFICANT CHANGE UP (ref 43–77)
NRBC # BLD AUTO: 0 K/UL — SIGNIFICANT CHANGE UP (ref 0–0)
NRBC # BLD: 0 /100 WBCS — SIGNIFICANT CHANGE UP (ref 0–0)
NRBC # FLD: 0 K/UL — SIGNIFICANT CHANGE UP (ref 0–0)
NRBC BLD-RTO: 0 /100 WBCS — SIGNIFICANT CHANGE UP (ref 0–0)
NT-PROBNP SERPL-SCNC: 155 PG/ML — SIGNIFICANT CHANGE UP
PLATELET # BLD AUTO: 231 K/UL — SIGNIFICANT CHANGE UP (ref 150–400)
POTASSIUM SERPL-MCNC: 4.2 MMOL/L — SIGNIFICANT CHANGE UP (ref 3.5–5.3)
POTASSIUM SERPL-SCNC: 4.2 MMOL/L — SIGNIFICANT CHANGE UP (ref 3.5–5.3)
PROT SERPL-MCNC: 6.8 G/DL — SIGNIFICANT CHANGE UP (ref 6–8.3)
RBC # BLD: 4.42 M/UL — SIGNIFICANT CHANGE UP (ref 4.2–5.8)
RBC # FLD: 11.9 % — SIGNIFICANT CHANGE UP (ref 10.3–14.5)
SODIUM SERPL-SCNC: 136 MMOL/L — SIGNIFICANT CHANGE UP (ref 135–145)
TROPONIN T, HIGH SENSITIVITY RESULT: 13 NG/L — SIGNIFICANT CHANGE UP
TROPONIN T, HIGH SENSITIVITY RESULT: 14 NG/L — SIGNIFICANT CHANGE UP
WBC # BLD: 5.44 K/UL — SIGNIFICANT CHANGE UP (ref 3.8–10.5)
WBC # FLD AUTO: 5.44 K/UL — SIGNIFICANT CHANGE UP (ref 3.8–10.5)

## 2024-08-01 PROCEDURE — 99285 EMERGENCY DEPT VISIT HI MDM: CPT

## 2024-08-01 PROCEDURE — 99284 EMERGENCY DEPT VISIT MOD MDM: CPT

## 2024-08-01 PROCEDURE — 93010 ELECTROCARDIOGRAM REPORT: CPT | Mod: 76

## 2024-08-01 PROCEDURE — 71045 X-RAY EXAM CHEST 1 VIEW: CPT | Mod: 26

## 2024-08-01 RX ORDER — ACETAMINOPHEN 500 MG/5ML
1000 LIQUID (ML) ORAL ONCE
Refills: 0 | Status: COMPLETED | OUTPATIENT
Start: 2024-08-01 | End: 2024-08-01

## 2024-08-01 RX ORDER — LIDOCAINE HYDROCHLORIDE 20 MG/ML
1 JELLY TOPICAL ONCE
Refills: 0 | Status: COMPLETED | OUTPATIENT
Start: 2024-08-01 | End: 2024-08-01

## 2024-08-01 RX ADMIN — Medication 400 MILLIGRAM(S): at 11:39

## 2024-08-01 RX ADMIN — Medication 1000 MILLIGRAM(S): at 12:09

## 2024-08-01 RX ADMIN — LIDOCAINE HYDROCHLORIDE 1 PATCH: 20 JELLY TOPICAL at 11:40

## 2024-09-30 ENCOUNTER — TRANSCRIPTION ENCOUNTER (OUTPATIENT)
Age: 69
End: 2024-09-30

## 2024-10-02 ENCOUNTER — APPOINTMENT (OUTPATIENT)
Dept: CARDIOLOGY | Facility: CLINIC | Age: 69
End: 2024-10-02

## 2025-08-06 ENCOUNTER — APPOINTMENT (OUTPATIENT)
Dept: CARDIOLOGY | Facility: CLINIC | Age: 70
End: 2025-08-06
Payer: MEDICARE

## 2025-08-06 VITALS
WEIGHT: 187 LBS | OXYGEN SATURATION: 97 % | SYSTOLIC BLOOD PRESSURE: 135 MMHG | BODY MASS INDEX: 21.64 KG/M2 | HEIGHT: 78 IN | DIASTOLIC BLOOD PRESSURE: 82 MMHG | HEART RATE: 89 BPM

## 2025-08-06 DIAGNOSIS — I10 ESSENTIAL (PRIMARY) HYPERTENSION: ICD-10-CM

## 2025-08-06 DIAGNOSIS — R07.89 OTHER CHEST PAIN: ICD-10-CM

## 2025-08-06 DIAGNOSIS — I25.10 ATHEROSCLEROTIC HEART DISEASE OF NATIVE CORONARY ARTERY W/OUT ANGINA PECTORIS: ICD-10-CM

## 2025-08-06 PROCEDURE — 93000 ELECTROCARDIOGRAM COMPLETE: CPT

## 2025-08-06 PROCEDURE — 99214 OFFICE O/P EST MOD 30 MIN: CPT | Mod: 25

## 2025-08-07 PROBLEM — R07.89 CHEST DISCOMFORT: Status: ACTIVE | Noted: 2025-08-07

## 2025-08-13 ENCOUNTER — APPOINTMENT (OUTPATIENT)
Dept: CV DIAGNOSTICS | Facility: HOSPITAL | Age: 70
End: 2025-08-13

## 2025-08-13 ENCOUNTER — OUTPATIENT (OUTPATIENT)
Dept: OUTPATIENT SERVICES | Facility: HOSPITAL | Age: 70
LOS: 1 days | End: 2025-08-13
Payer: MEDICAID

## 2025-08-13 ENCOUNTER — RESULT REVIEW (OUTPATIENT)
Age: 70
End: 2025-08-13

## 2025-08-13 DIAGNOSIS — I25.10 ATHEROSCLEROTIC HEART DISEASE OF NATIVE CORONARY ARTERY WITHOUT ANGINA PECTORIS: ICD-10-CM

## 2025-08-13 PROCEDURE — 93016 CV STRESS TEST SUPVJ ONLY: CPT | Mod: GC

## 2025-08-13 PROCEDURE — 93018 CV STRESS TEST I&R ONLY: CPT | Mod: GC

## 2025-08-13 PROCEDURE — 78452 HT MUSCLE IMAGE SPECT MULT: CPT | Mod: 26

## 2025-08-20 DIAGNOSIS — R94.39 ABNORMAL RESULT OF OTHER CARDIOVASCULAR FUNCTION STUDY: ICD-10-CM
